# Patient Record
Sex: FEMALE | NOT HISPANIC OR LATINO | Employment: FULL TIME | URBAN - METROPOLITAN AREA
[De-identification: names, ages, dates, MRNs, and addresses within clinical notes are randomized per-mention and may not be internally consistent; named-entity substitution may affect disease eponyms.]

---

## 2017-01-24 ENCOUNTER — OFFICE VISIT (OUTPATIENT)
Dept: FAMILY MEDICINE CLINIC | Facility: CLINIC | Age: 37
End: 2017-01-24

## 2017-01-24 VITALS
OXYGEN SATURATION: 99 % | WEIGHT: 154 LBS | BODY MASS INDEX: 24.75 KG/M2 | HEIGHT: 66 IN | DIASTOLIC BLOOD PRESSURE: 80 MMHG | HEART RATE: 80 BPM | TEMPERATURE: 98.4 F | SYSTOLIC BLOOD PRESSURE: 120 MMHG

## 2017-01-24 DIAGNOSIS — R05.9 COUGH: Primary | ICD-10-CM

## 2017-01-24 PROCEDURE — 99213 OFFICE O/P EST LOW 20 MIN: CPT | Performed by: NURSE PRACTITIONER

## 2017-01-24 RX ORDER — DEXTROMETHORPHAN HYDROBROMIDE AND PROMETHAZINE HYDROCHLORIDE 15; 6.25 MG/5ML; MG/5ML
5 SYRUP ORAL 4 TIMES DAILY PRN
Qty: 240 ML | Refills: 0 | Status: SHIPPED | OUTPATIENT
Start: 2017-01-24 | End: 2017-11-16

## 2017-01-24 RX ORDER — LORATADINE 10 MG/1
10 TABLET ORAL DAILY
Qty: 30 TABLET | Refills: 1 | Status: SHIPPED | OUTPATIENT
Start: 2017-01-24 | End: 2017-11-16

## 2017-01-24 RX ORDER — ALBUTEROL SULFATE 90 UG/1
2 AEROSOL, METERED RESPIRATORY (INHALATION) EVERY 4 HOURS PRN
Qty: 1 INHALER | Refills: 11 | Status: SHIPPED | OUTPATIENT
Start: 2017-01-24 | End: 2017-11-16

## 2017-01-24 NOTE — PROGRESS NOTES
Subjective   Sue Hall is a 36 y.o. female.     History of Present Illness Persistent dry cough for one month. Some post-nasal drip. No SOB or wheezing. No chest congestion. No fever. Treated with Nyquil and Robitussin. It is worse at night. Having coughing spasms. Will occasionally wheeze with coughing fits.    The following portions of the patient's history were reviewed and updated as appropriate: allergies, current medications, past family history, past medical history, past social history, past surgical history and problem list.    Review of Systems   Constitutional: Negative for fatigue, fever and unexpected weight change.   HENT: Negative for congestion, hearing loss, nosebleeds, rhinorrhea, sore throat, trouble swallowing and voice change.    Eyes: Negative for pain, discharge, redness and visual disturbance.   Respiratory: Negative for cough, chest tightness, shortness of breath and wheezing.    Cardiovascular: Negative for chest pain, palpitations and leg swelling.   Gastrointestinal: Negative for abdominal distention, abdominal pain, anal bleeding, blood in stool, constipation, diarrhea, nausea and vomiting.   Endocrine: Negative for cold intolerance, heat intolerance, polydipsia, polyphagia and polyuria.   Genitourinary: Negative for dysuria, flank pain, frequency and hematuria.   Musculoskeletal: Negative for arthralgias, gait problem, joint swelling and myalgias.   Skin: Negative for color change and rash.   Neurological: Negative for dizziness, tremors, seizures, syncope, speech difficulty, weakness, numbness and headaches.   Hematological: Negative.    Psychiatric/Behavioral: Negative.        Objective   Physical Exam   Constitutional: She is oriented to person, place, and time. She appears well-developed and well-nourished. No distress.   HENT:   Head: Normocephalic and atraumatic.   Right Ear: Tympanic membrane and external ear normal.   Left Ear: Tympanic membrane and external ear normal.    Nose: Nose normal.   Mouth/Throat: Oropharynx is clear and moist. No oropharyngeal exudate.   Eyes: Conjunctivae are normal. Pupils are equal, round, and reactive to light. Right eye exhibits no discharge. Left eye exhibits no discharge. No scleral icterus.   Neck: Neck supple. No tracheal deviation present. No thyromegaly present.   Cardiovascular: Normal rate, regular rhythm and normal heart sounds.  Exam reveals no gallop and no friction rub.    No murmur heard.  Pulmonary/Chest: Effort normal and breath sounds normal. No respiratory distress. She has no wheezes.   Abdominal: Soft. Bowel sounds are normal. She exhibits no distension and no mass. There is no tenderness.   Musculoskeletal: She exhibits no edema or deformity.   Lymphadenopathy:     She has no cervical adenopathy.   Neurological: She is alert and oriented to person, place, and time. Coordination normal.   Skin: Skin is warm and dry. No rash noted. No erythema.   Psychiatric: She has a normal mood and affect. Her speech is normal and behavior is normal. Judgment and thought content normal.   Nursing note and vitals reviewed.      Assessment/Plan   Sue was seen today for cough.    Diagnoses and all orders for this visit:    Cough  -     albuterol (PROVENTIL HFA;VENTOLIN HFA) 108 (90 BASE) MCG/ACT inhaler; Inhale 2 puffs Every 4 (Four) Hours As Needed for wheezing.  -     loratadine (CLARITIN) 10 MG tablet; Take 1 tablet by mouth Daily.  -     promethazine-dextromethorphan (PROMETHAZINE-DM) 6.25-15 MG/5ML syrup; Take 5 mL by mouth 4 (Four) Times a Day As Needed for cough.      Encouraged her to use a humidifier. Follow up symptoms persist in 2 weeks. Consider CXR and steroids.

## 2017-01-24 NOTE — MR AVS SNAPSHOT
BarreraramseyTessie Hall   1/24/2017 12:45 PM   Office Visit    Provider:  Santy Armendariz DNP, APRN   Department:  [facility]   Dept Phone:  [phone]                Your Full Care Plan              Today's Medication Changes          These changes are accurate as of: 1/24/17  1:37 PM.  If you have any questions, ask your nurse or doctor.               New Medication(s)Ordered:     albuterol 108 (90 BASE) MCG/ACT inhaler   Commonly known as:  PROVENTIL HFA;VENTOLIN HFA   Inhale 2 puffs Every 4 (Four) Hours As Needed for wheezing.   Started by:  Santy Armendariz DNP, APRN       loratadine 10 MG tablet   Commonly known as:  CLARITIN   Take 1 tablet by mouth Daily.   Started by:  Santy Armendariz DNP, APRN       promethazine-dextromethorphan 6.25-15 MG/5ML syrup   Commonly known as:  PROMETHAZINE-DM   Take 5 mL by mouth 4 (Four) Times a Day As Needed for cough.   Started by:  Santy Armendariz DNP, APRN            Where to Get Your Medications      These medications were sent to [facility]/pharmacy #[id number] - [place], [place] - [address] - [phone]  - [phone] [address] [address]     Phone:  [phone]     albuterol 108 (90 BASE) MCG/ACT inhaler    loratadine 10 MG tablet    promethazine-dextromethorphan 6.25-15 MG/5ML syrup                  Your Updated Medication List          This list is accurate as of: 1/24/17  1:37 PM.  Always use your most recent med list.                albuterol 108 (90 BASE) MCG/ACT inhaler   Commonly known as:  PROVENTIL HFA;VENTOLIN HFA   Inhale 2 puffs Every 4 (Four) Hours As Needed for wheezing.       loratadine 10 MG tablet   Commonly known as:  CLARITIN   Take 1 tablet by mouth Daily.       promethazine-dextromethorphan 6.25-15 MG/5ML syrup   Commonly known as:  PROMETHAZINE-DM   Take 5 mL by mouth 4 (Four) Times a Day As Needed for cough.               You Were Diagnosed With        "Codes Comments    Cough    -  Primary ICD-10-CM: R05  ICD-9-CM: 786.2       Instructions     None    Patient Instructions History      MyChart Signup     [facility] allows you to send messages to your doctor, view your test results, renew your prescriptions, schedule appointments, and more. To sign up, go to [employer] and click on the Sign Up Now link in the New User? box. Enter your [employer] Activation Code exactly as it appears below along with the last four digits of your Social Security Number and your Date of Birth () to complete the sign-up process. If you do not sign up before the expiration date, you must request a new code.    [employer] Activation Code: [email or web address]  Expires: 2017  1:37 PM    If you have questions, you can email [email or web address] or call [phone] to talk to our [employer] staff. Remember, [employer] is NOT to be used for urgent needs. For medical emergencies, dial 911.               Other Info from Your Visit           Your Appointments     Mar 15, 2017  8:30 AM EDT   Physical with Isabel Mckeon MD   [facility] (--)    [address] [place] [address] [address]  [facility] [id number] [phone]           Arrive 15 minutes prior to appointment.              Allergies     No Known Allergies      Reason for Visit     Cough mostly dry cough, been going on since december. Pt states that she doesnt sleep well for it keeping her up.       Vital Signs     Blood Pressure Pulse Temperature Height Weight Oxygen Saturation    120/80 80 98.4 °F (36.9 °C) 65.5\" (166.4 cm) 154 lb (69.9 kg) 99%    Body Mass Index Smoking Status                25.24 kg/m2 Never Smoker          Problems and Diagnoses Noted     Headache    Cough    -  Primary      "

## 2017-03-15 ENCOUNTER — OFFICE VISIT (OUTPATIENT)
Dept: FAMILY MEDICINE CLINIC | Facility: CLINIC | Age: 37
End: 2017-03-15

## 2017-03-15 VITALS
WEIGHT: 155 LBS | HEART RATE: 66 BPM | BODY MASS INDEX: 25.83 KG/M2 | TEMPERATURE: 97.9 F | HEIGHT: 65 IN | DIASTOLIC BLOOD PRESSURE: 82 MMHG | SYSTOLIC BLOOD PRESSURE: 114 MMHG | OXYGEN SATURATION: 99 %

## 2017-03-15 DIAGNOSIS — Z00.00 ANNUAL PHYSICAL EXAM: Primary | ICD-10-CM

## 2017-03-15 DIAGNOSIS — R31.9 HEMATURIA: ICD-10-CM

## 2017-03-15 DIAGNOSIS — M76.32 ILIOTIBIAL BAND SYNDROME OF BOTH SIDES: ICD-10-CM

## 2017-03-15 DIAGNOSIS — M76.31 ILIOTIBIAL BAND SYNDROME OF BOTH SIDES: ICD-10-CM

## 2017-03-15 LAB
25(OH)D3 SERPL-MCNC: 31.3 NG/ML
ALBUMIN SERPL-MCNC: 4.6 G/DL (ref 3.2–4.8)
ALBUMIN/GLOB SERPL: 1.5 G/DL (ref 1.5–2.5)
ALP SERPL-CCNC: 67 U/L (ref 25–100)
ALT SERPL W P-5'-P-CCNC: 24 U/L (ref 7–40)
ANION GAP SERPL CALCULATED.3IONS-SCNC: 10 MMOL/L (ref 3–11)
ARTICHOKE IGE QN: 96 MG/DL (ref 0–130)
AST SERPL-CCNC: 24 U/L (ref 0–33)
BASOPHILS # BLD AUTO: 0.01 10*3/MM3 (ref 0–0.2)
BASOPHILS NFR BLD AUTO: 0.1 % (ref 0–1)
BILIRUB BLD-MCNC: NEGATIVE MG/DL
BILIRUB SERPL-MCNC: 1.1 MG/DL (ref 0.3–1.2)
BUN BLD-MCNC: 12 MG/DL (ref 9–23)
BUN/CREAT SERPL: 20 (ref 7–25)
CALCIUM SPEC-SCNC: 10.4 MG/DL (ref 8.7–10.4)
CHLORIDE SERPL-SCNC: 104 MMOL/L (ref 99–109)
CHOLEST SERPL-MCNC: 166 MG/DL (ref 0–200)
CLARITY, POC: CLEAR
CO2 SERPL-SCNC: 26 MMOL/L (ref 20–31)
COLOR UR: YELLOW
CREAT BLD-MCNC: 0.6 MG/DL (ref 0.6–1.3)
DEPRECATED RDW RBC AUTO: 44 FL (ref 37–54)
EOSINOPHIL # BLD AUTO: 0.13 10*3/MM3 (ref 0.1–0.3)
EOSINOPHIL NFR BLD AUTO: 1.8 % (ref 0–3)
ERYTHROCYTE [DISTWIDTH] IN BLOOD BY AUTOMATED COUNT: 13.4 % (ref 11.3–14.5)
GFR SERPL CREATININE-BSD FRML MDRD: 112 ML/MIN/1.73
GFR SERPL CREATININE-BSD FRML MDRD: 136 ML/MIN/1.73
GLOBULIN UR ELPH-MCNC: 3.1 GM/DL
GLUCOSE BLD-MCNC: 84 MG/DL (ref 70–100)
GLUCOSE UR STRIP-MCNC: NEGATIVE MG/DL
HBA1C MFR BLD: 5.1 % (ref 4.8–5.6)
HCT VFR BLD AUTO: 42.9 % (ref 34.5–44)
HDLC SERPL-MCNC: 71 MG/DL (ref 40–60)
HGB BLD-MCNC: 14 G/DL (ref 11.5–15.5)
IMM GRANULOCYTES # BLD: 0.01 10*3/MM3 (ref 0–0.03)
IMM GRANULOCYTES NFR BLD: 0.1 % (ref 0–0.6)
KETONES UR QL: NEGATIVE
LEUKOCYTE EST, POC: NEGATIVE
LYMPHOCYTES # BLD AUTO: 2.28 10*3/MM3 (ref 0.6–4.8)
LYMPHOCYTES NFR BLD AUTO: 31.4 % (ref 24–44)
MCH RBC QN AUTO: 29.5 PG (ref 27–31)
MCHC RBC AUTO-ENTMCNC: 32.6 G/DL (ref 32–36)
MCV RBC AUTO: 90.5 FL (ref 80–99)
MONOCYTES # BLD AUTO: 0.48 10*3/MM3 (ref 0–1)
MONOCYTES NFR BLD AUTO: 6.6 % (ref 0–12)
NEUTROPHILS # BLD AUTO: 4.34 10*3/MM3 (ref 1.5–8.3)
NEUTROPHILS NFR BLD AUTO: 60 % (ref 41–71)
NITRITE UR-MCNC: NEGATIVE MG/ML
PH UR: 6.5 [PH] (ref 5–8)
PLATELET # BLD AUTO: 335 10*3/MM3 (ref 150–450)
PMV BLD AUTO: 10.8 FL (ref 6–12)
POTASSIUM BLD-SCNC: 4.5 MMOL/L (ref 3.5–5.5)
PROT SERPL-MCNC: 7.7 G/DL (ref 5.7–8.2)
PROT UR STRIP-MCNC: NEGATIVE MG/DL
RBC # BLD AUTO: 4.74 10*6/MM3 (ref 3.89–5.14)
RBC # UR STRIP: ABNORMAL /UL
SODIUM BLD-SCNC: 140 MMOL/L (ref 132–146)
SP GR UR: 1.01 (ref 1–1.03)
TRIGL SERPL-MCNC: 63 MG/DL (ref 0–150)
TSH SERPL DL<=0.05 MIU/L-ACNC: 2.05 MIU/ML (ref 0.35–5.35)
UROBILINOGEN UR QL: NORMAL
VIT B12 BLD-MCNC: 626 PG/ML (ref 211–911)
WBC NRBC COR # BLD: 7.25 10*3/MM3 (ref 3.5–10.8)

## 2017-03-15 PROCEDURE — 82306 VITAMIN D 25 HYDROXY: CPT | Performed by: FAMILY MEDICINE

## 2017-03-15 PROCEDURE — 85025 COMPLETE CBC W/AUTO DIFF WBC: CPT | Performed by: FAMILY MEDICINE

## 2017-03-15 PROCEDURE — 81003 URINALYSIS AUTO W/O SCOPE: CPT | Performed by: FAMILY MEDICINE

## 2017-03-15 PROCEDURE — 99395 PREV VISIT EST AGE 18-39: CPT | Performed by: FAMILY MEDICINE

## 2017-03-15 PROCEDURE — 80053 COMPREHEN METABOLIC PANEL: CPT | Performed by: FAMILY MEDICINE

## 2017-03-15 PROCEDURE — 83036 HEMOGLOBIN GLYCOSYLATED A1C: CPT | Performed by: FAMILY MEDICINE

## 2017-03-15 PROCEDURE — 87086 URINE CULTURE/COLONY COUNT: CPT | Performed by: FAMILY MEDICINE

## 2017-03-15 PROCEDURE — 84443 ASSAY THYROID STIM HORMONE: CPT | Performed by: FAMILY MEDICINE

## 2017-03-15 PROCEDURE — 82607 VITAMIN B-12: CPT | Performed by: FAMILY MEDICINE

## 2017-03-15 PROCEDURE — 80061 LIPID PANEL: CPT | Performed by: FAMILY MEDICINE

## 2017-03-15 PROCEDURE — 36415 COLL VENOUS BLD VENIPUNCTURE: CPT | Performed by: FAMILY MEDICINE

## 2017-03-15 NOTE — PATIENT INSTRUCTIONS
Iliotibial Band Syndrome With Rehab  The iliotibial (IT) band is a tendon that connects the hip muscles to the shinbone (tibia) and to one of the bones of the pelvis (ileum). The IT band passes by the knee and is often irritated by the outer portion of the knee (lateral femoral condyle). A fluid filled sac (bursa) exists between the tendon and the bone, to cushion and reduce friction. Overuse of the tendon may cause excessive friction, which results in IT band syndrome. This condition involves inflammation of the bursa (bursitis) and/or inflammation of the IT band (tendinitis).  SYMPTOMS   · Pain, tenderness, swelling, warmth, or redness over the IT band, at the outer knee (above the joint).  · Pain that travels up or down the thigh or leg.  · Initially, pain at the beginning of an exercise, that decreases once warmed up. Eventually, pain throughout the activity, getting worse as the activity continues. May cause the athlete to stop in the middle of training or competing.  · Pain that gets worse when running down hills or stairs, on banked tracks, or next to the curb on the street.  · Pain that increases when the foot of the affected leg hits the ground.  · Possibly, a crackling sound (crepitation) when the tendon or bursa is moved or touched.  CAUSES   IT band syndrome is caused by irritation of the IT band and the underlying bursa. This eventually results in inflammation and pain. IT band syndrome is an overuse injury.   RISK INCREASES WITH:  · Sports with repetitive knee-bending activities (distance running, cycling).  · Incorrect training techniques, including sudden changes in the intensity, frequency, or duration of training.  · Not enough rest between workouts.  · Poor strength and flexibility, especially a tight IT band.  · Failure to warm up properly before activity.  · [place] legs.  · Arthritis of the knee.  PREVENTION   · Warm up and stretch properly before activity.  · Allow for adequate recovery between  workouts.  · Maintain physical fitness:    Strength, flexibility, and endurance.    Cardiovascular fitness.  · Learn and use proper training technique, including reducing running mileage, shortening stride, and avoiding running on hills and banked surfaces.  · Wear arch supports (orthotics), if you have flat feet.  PROGNOSIS   If treated properly, IT band syndrome usually goes away within 6 weeks of treatment.  RELATED COMPLICATIONS   · Longer healing time, if not properly treated, or if not given enough time to heal.  · Recurring inflammation of the tendon and bursa, that may result in a chronic condition.  · Recurring symptoms, if activity is resumed too soon, with overuse, with a direct blow, or with poor training technique.  · Inability to complete training or competition.  TREATMENT   Treatment first involves the use of ice and medicine, to reduce pain and inflammation. The use of strengthening and stretching exercises may help reduce pain with activity. These exercises may be performed at home or with a therapist. For individuals with flat feet, an arch support (orthotic) may be helpful. Some individuals find that wearing a knee sleeve or compression bandage around the knee during workouts provides some relief. Certain training techniques, such as adjusting stride length, avoiding running on hills or stairs, changing the direction you run on a circular or banked track, or changing the side of the road you run on, if you run next to the curb, may help decrease symptoms of IT band syndrome. Cyclists may need to change the seat height or foot position on their bicycles. An injection of cortisone into the bursa may be recommended. Surgery to remove the inflamed bursa and/or part of the IT band is only considered after at least 6 months of non-surgical treatment.   MEDICATION   · If pain medicine is needed, nonsteroidal anti-inflammatory medicines (aspirin and ibuprofen), or other minor pain relievers  (acetaminophen), are often advised.  · Do not take pain medicine for 7 days before surgery.  · Prescription pain relievers may be given, if your caregiver thinks they are needed. Use only as directed and only as much as you need.  · Corticosteroid injections may be given by your caregiver. These injections should be reserved for the most serious cases, because they may only be given a certain number of times.  HEAT AND COLD  · Cold treatment (icing) should be applied for 10 to 15 minutes every 2 to 3 hours for inflammation and pain, and immediately after activity that aggravates your symptoms. Use ice packs or an ice massage.  · Heat treatment may be used before performing stretching and strengthening activities prescribed by your caregiver, physical therapist, or . Use a heat pack or a warm water soak.  SEEK MEDICAL CARE IF:   · Symptoms get worse or do not improve in 2 to 4 weeks, despite treatment.  · New, unexplained symptoms develop. (Drugs used in treatment may produce side effects.)  EXERCISES   RANGE OF MOTION (ROM) AND STRETCHING EXERCISES - Iliotibial Band Syndrome  These exercises may help you when beginning to rehabilitate your injury. Your symptoms may go away with or without further involvement from your physician, physical therapist or . While completing these exercises, remember:   · Restoring tissue flexibility helps normal motion to return to the joints. This allows healthier, less painful movement and activity.  · An effective stretch should be held for at least 30 seconds.  · A stretch should never be painful. You should only feel a gentle lengthening or release in the stretched tissue.  STRETCH - Quadriceps, Prone   · Lie on your stomach on a firm surface, such as a bed or padded floor.  · Bend your right / left knee and grasp your ankle. If you are unable to reach your ankle or pant leg, use a belt around your foot to lengthen your reach.  · Gently pull your heel  toward your buttocks. Your knee should not slide out to the side. You should feel a stretch in the front of your thigh and knee.  · Hold this position for __________ seconds.  Repeat __________ times. Complete this stretch __________ times per day.   STRETCH - Iliotibial Band  · On the floor or bed, lie on your side, so your right / left leg is on top. Bend your knee and grab your ankle.  · Slowly bring your knee back so that your thigh is in line with your trunk. Keep your heel at your buttocks and gently arch your back, so your head, shoulders and hips line up.  · Slowly lower your leg so that your knee approaches the floor or bed, until you feel a gentle stretch on the outside of your right / left thigh. If you do not feel a stretch and your knee will not fall farther, place the heel of your opposite foot on top of your knee, and pull your thigh down farther.  · Hold this stretch for __________ seconds.  Repeat __________ times. Complete this stretch __________ times per day.  STRENGTHENING EXERCISES - Iliotibial Band Syndrome  Improving the flexibility of the IT band will best relieve your discomfort due to IT band syndrome. Strengthening exercises, however, can help improve both muscle endurance and joint mechanics, reducing the factors that can contribute to this condition. Your physician, physical therapist or  may provide you with exercises that train specific muscle groups that are especially weak. The following exercises target muscles that are often weak in people who have IT band syndrome.  STRENGTH - Hip Abductors, Straight Leg Raises   Be aware of your form throughout the entire exercise, so that you exercise the correct muscles. Poor form means that you are not strengthening the correct muscles.  · Lie on your side, so that your head, shoulders, knee and hip line up. You may bend your lower knee to help maintain your balance. Your right / left leg should be on top.  · Roll your hips  slightly forward, so that your hips are stacked directly over each other and your right / left knee is facing forward.  · Lift your top leg up 4-6 inches, leading with your heel. Be sure that your foot does not drift forward and that your knee does not roll toward the ceiling.  · Hold this position for __________ seconds. You should feel the muscles in your outer hip lifting (you may not notice this until your leg begins to tire).  · Slowly lower your leg to the starting position. Allow the muscles to fully relax before beginning the next repetition.  Repeat __________ times. Complete this exercise __________ times per day.   STRENGTH - Quad/VMO, Isometric  · Sit in a chair with your right / left knee slightly bent. With your fingertips, feel the VMO muscle (just above the inside of your knee). The VMO is important in controlling the position of your kneecap.  · Keeping your fingertips on this muscle. Without actually moving your leg, attempt to drive your knee down, as if straightening your leg. You should feel your VMO tense. If you have a difficult time, you may wish to try the same exercise on your healthy knee first.  · Tense this muscle as hard as you can, without increasing any knee pain.  · Hold for __________ seconds. Relax the muscles slowly and completely between each repetition.  Repeat __________ times. Complete this exercise __________ times per day.      This information is not intended to replace advice given to you by your health care provider. Make sure you discuss any questions you have with your health care provider.     Document Released: 12/18/2006 Document Revised: 01/08/2016 Document Reviewed: 04/01/2010  [employer] Interactive Patient Education ©2016 [employer]    Escitalopram tablets  What is this medicine?  ESCITALOPRAM (es sye DHEERAJ oh pram) is used to treat depression and certain types of anxiety.  This medicine may be used for other purposes; ask your health care provider or pharmacist  if you have questions.  COMMON BRAND NAME(S): Lexapro  What should I tell my health care provider before I take this medicine?  They need to know if you have any of these conditions:  -bipolar disorder or a family history of bipolar disorder  -diabetes  -glaucoma  -heart disease  -kidney or liver disease  -receiving electroconvulsive therapy  -seizures (convulsions)  -suicidal thoughts, plans, or attempt by you or a family member  -an unusual or allergic reaction to escitalopram, the related drug citalopram, other medicines, foods, dyes, or preservatives  -pregnant or trying to become pregnant  -breast-feeding  How should I use this medicine?  Take this medicine by mouth with a glass of water. Follow the directions on the prescription label. You can take it with or without food. If it upsets your stomach, take it with food. Take your medicine at regular intervals. Do not take it more often than directed. Do not stop taking this medicine suddenly except upon the advice of your doctor. Stopping this medicine too quickly may cause serious side effects or your condition may worsen.  A special MedGuide will be given to you by the pharmacist with each prescription and refill. Be sure to read this information carefully each time.  Talk to your pediatrician regarding the use of this medicine in children. Special care may be needed.  Overdosage: If you think you have taken too much of this medicine contact a poison control center or emergency room at once.  NOTE: This medicine is only for you. Do not share this medicine with others.  What if I miss a dose?  If you miss a dose, take it as soon as you can. If it is almost time for your next dose, take only that dose. Do not take double or extra doses.  What may interact with this medicine?  Do not take this medicine with any of the following medications:  -certain medicines for fungal infections like fluconazole, itraconazole, ketoconazole, posaconazole,  voriconazole  -cisapride  -citalopram  -dofetilide  -dronedarone  -linezolid  -MAOIs like Carbex, Eldepryl, Marplan, Nardil, and Parnate  -methylene blue (injected into a vein)  -pimozide  -thioridazine  -ziprasidone  This medicine may also interact with the following medications:  -alcohol  -aspirin and aspirin-like medicines  -carbamazepine  -certain medicines for depression, anxiety, or psychotic disturbances  -certain medicines for migraine headache like almotriptan, eletriptan, frovatriptan, naratriptan, rizatriptan, sumatriptan, zolmitriptan  -certain medicines for sleep  -certain medicines that treat or prevent blood clots like warfarin, enoxaparin, dalteparin  -cimetidine  -diuretics  -fentanyl  -furazolidone  -isoniazid  -lithium  -metoprolol  -NSAIDs, medicines for pain and inflammation, like ibuprofen or naproxen  -other medicines that prolong the QT interval (cause an abnormal heart rhythm)  -procarbazine  -rasagiline  -supplements like [place]'s wort, kava kava, valerian  -tramadol  -tryptophan  This list may not describe all possible interactions. Give your health care provider a list of all the medicines, herbs, non-prescription drugs, or dietary supplements you use. Also tell them if you smoke, drink alcohol, or use illegal drugs. Some items may interact with your medicine.  What should I watch for while using this medicine?  Tell your doctor if your symptoms do not get better or if they get worse. Visit your doctor or health care professional for regular checks on your progress. Because it may take several weeks to see the full effects of this medicine, it is important to continue your treatment as prescribed by your doctor.  Patients and their families should watch out for new or worsening thoughts of suicide or depression. Also watch out for sudden changes in feelings such as feeling anxious, agitated, panicky, irritable, hostile, aggressive, impulsive, severely restless, overly excited and  hyperactive, or not being able to sleep. If this happens, especially at the beginning of treatment or after a change in dose, call your health care professional.  You may get drowsy or dizzy. Do not drive, use machinery, or do anything that needs mental alertness until you know how this medicine affects you. Do not stand or sit up quickly, especially if you are an older patient. This reduces the risk of dizzy or fainting spells. Alcohol may interfere with the effect of this medicine. Avoid alcoholic drinks.  Your mouth may get dry. Chewing sugarless gum or sucking hard candy, and drinking plenty of water may help. Contact your doctor if the problem does not go away or is severe.  What side effects may I notice from receiving this medicine?  Side effects that you should report to your doctor or health care professional as soon as possible:  -allergic reactions like skin rash, itching or hives, swelling of the face, lips, or tongue  -confusion  -feeling faint or lightheaded, falls  -fast talking and excited feelings or actions that are out of control  -hallucination, loss of contact with reality  -seizures  -suicidal thoughts or other mood changes  -unusual bleeding or bruising  Side effects that usually do not require medical attention (report to your doctor or health care professional if they continue or are bothersome):  -blurred vision  -changes in appetite  -change in sex drive or performance  -headache  -increased sweating  -nausea  This list may not describe all possible side effects. Call your doctor for medical advice about side effects. You may report side effects to FDA at 5-439-FDA-7966.  Where should I keep my medicine?  Keep out of reach of children.  Store at room temperature between 15 and 30 degrees C (59 and 86 degrees F). Throw away any unused medicine after the expiration date.  NOTE: This sheet is a summary. It may not cover all possible information. If you have questions about this medicine, talk  to your doctor, pharmacist, or health care provider.     © 2016, [employer]. (2014-07-15 12:32:55)

## 2017-03-15 NOTE — PROGRESS NOTES
Subjective   Sue Hall is a 36 y.o. female and is here for a comprehensive physical exam. The patient reports no problems. Patient reports last physical date of 2016  Pt last physical 3/7/2016.  Pt is not fasting.  Pt last pap 2015. Pt sees Dr Aguilar at [facility]  Pt last mammagram at age of 28.    Reports that she exercises and runs 3 times a week.  She is recently in the last few years started having bilateral knee pain left greater than right when she runs.  She did try some IT band exercises and that did help.  She reports that she is not good at stretching prior to running or after running.  She is not interested in seeing a physical therapist at this time.    She also reports that she has had some anxiety off and on for the last few months.  It is worse when she is at home alone because her  is traveling.  She reports that she feels overwhelmed and anxious.  She is usually able to calm herself down and is not interested in starting the medicine at this time.  No suicidal or homicidal ideation.    She also reports that she has an upper back pain due to large breasts.  She has discussed this with her gynecologist in the past and is interested in breast reduction surgery.  She has not yet done physical therapy.  She continues to keep her weight in check.  She reports she has to wear to sports bottles when she runs.    Do you take any herbs or supplements that were not prescribed by a doctor? no vit d otc  Are you taking calcium supplements? yes  Are you taking aspirin daily? no  Family history of ovarian cancer? no  Family history of breast cancer? no  FH of endometrial cancer? yes pat gm  FH of cervical cancer? na  FH of colon cancer? no    Cancer Screening  Mammogram up-to-date?  yes  If yes, last exam date: she had 1 mammogram at 28 palp nodule  BMD up-to-date? na  If yes, last exam date: na  Colonoscopy up-to-date? na   If yes, last exam date: na  Pap up-to-date? yes   If yes, last exam date:  2016     History:  LMP: Patient's last menstrual period was 2017 (exact date).  Menopause at na years  Last pap date: 3/2016  Abnormal pap? no  : 2  Para: 2    Immunization History  Tdap?   HPV? not applicable  Pneumonia? not applicable  Shingles? not applicable    The following portions of the patient's history were reviewed and updated as appropriate: allergies, current medications, past family history, past medical history, past social history, past surgical history and problem list.    Past Medical History   Diagnosis Date   • Acne    • Cough    • Headache    • Sinusitis    • Sore throat    • Upper respiratory infection        Family History   Problem Relation Age of Onset   • Uterine cancer Other        Past Surgical History   Procedure Laterality Date   •  section         Social History     Social History   • Marital status:      Spouse name: N/A   • Number of children: N/A   • Years of education: N/A     Occupational History   • Not on file.     Social History Main Topics   • Smoking status: Never Smoker   • Smokeless tobacco: Not on file   • Alcohol use Yes      Comment: rarely   • Drug use: No   • Sexual activity: Yes     Partners: Male     Birth control/ protection: Condom     Other Topics Concern   • Not on file     Social History Narrative    Pt is . Pt has 2 children. 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       Review of Systems  Do you have pain that bothers you in your daily life? no  Review of Systems   Constitutional: Negative for chills, fatigue, fever and unexpected weight change.   HENT: Negative for congestion, ear pain, nosebleeds, rhinorrhea, sinus pressure, sneezing, sore throat and trouble swallowing.    Eyes: Negative for itching and visual disturbance.   Respiratory: Negative for cough, chest tightness, shortness of breath and wheezing.    Cardiovascular: Negative for chest pain, palpitations and leg swelling.   Gastrointestinal: Negative for abdominal  pain, anal bleeding, blood in stool, constipation, diarrhea, nausea and vomiting.   Endocrine: Negative for cold intolerance, heat intolerance, polydipsia and polyuria.   Genitourinary: Negative for difficulty urinating, frequency, hematuria and urgency.   Musculoskeletal: Negative for back pain, gait problem and myalgias.   Skin: Negative for rash and wound.   Neurological: Negative for dizziness, weakness, numbness and headaches.   Hematological: Negative for adenopathy. Does not bruise/bleed easily.   Psychiatric/Behavioral: Negative for agitation, confusion, decreased concentration and suicidal ideas. The patient is not nervous/anxious.        Objective   Physical Exam   Constitutional: She is oriented to person, place, and time. She appears well-developed and well-nourished.   HENT:   Head: Normocephalic and atraumatic.   Nose: Mucosal edema present.   Eyes: EOM are normal. Pupils are equal, round, and reactive to light. Right eye exhibits no discharge. Left eye exhibits no discharge.   Neck: Normal range of motion. Neck supple. No tracheal deviation present. No thyromegaly present.   Cardiovascular: Normal rate, regular rhythm and intact distal pulses.  Exam reveals no friction rub.    No murmur heard.  Pulmonary/Chest: Effort normal and breath sounds normal. No respiratory distress. She has no wheezes. She has no rales.   Abdominal: Soft. Bowel sounds are normal. She exhibits no distension and no mass. There is no tenderness. There is no rebound and no guarding.   Musculoskeletal: Normal range of motion. She exhibits no edema.        Right shoulder: She exhibits no swelling.   Neurological: She is alert and oriented to person, place, and time. She has normal reflexes. She displays normal reflexes.   Skin: Skin is warm and dry. No cyanosis. Nails show no clubbing.   Psychiatric: She has a normal mood and affect. Her behavior is normal. Judgment and thought content normal.        Assessment/Plan   Healthy  female exam.      1.   Problem List Items Addressed This Visit        Musculoskeletal and Integument    Iliotibial band syndrome of both sides       Other    Annual physical exam - Primary    Relevant Orders    CBC & Differential    TSH    Comprehensive Metabolic Panel    Hemoglobin A1c    Lipid Panel    Vitamin D 25 Hydroxy    Vitamin B12    POC Urinalysis Dipstick, Automated (Completed)    Urine Culture    CBC Auto Differential      Other Visit Diagnoses     Hematuria        Relevant Orders    Urine Culture            2. Patient Counseling:  --Nutrition: Stressed importance of moderation in sodium/caffeine intake, saturated fat and cholesterol, caloric balance, sufficient intake of fresh fruits, vegetables, fiber, calcium, iron, and 1 mg of folate supplement per day (for females capable of pregnancy).  --Discussed the issue of estrogen replacement, calcium supplement, and the daily use of baby aspirin.  --Exercise: Stressed the importance of regular exercise.   --Substance Abuse: Discussed cessation/primary prevention of tobacco, alcohol, or other drug use; driving or other dangerous activities under the influence; availability of treatment for abuse.    --Sexuality: Discussed sexually transmitted diseases, partner selection, use of condoms, avoidance of unintended pregnancy  and contraceptive alternatives.   --Injury prevention: Discussed safety belts, safety helmets, smoke detector, smoking near bedding or upholstery.   --Dental health: Discussed importance of regular tooth brushing, flossing, and dental visits.  --Immunizations reviewed.  --Discussed benefits of screening colonoscopy.  --After hours service discussed with patient    3. Discussed the patient's BMI with her.  The BMI is in the acceptable range  4. Follow up in one year

## 2017-03-17 LAB — BACTERIA SPEC AEROBE CULT: NORMAL

## 2017-11-16 ENCOUNTER — OFFICE VISIT (OUTPATIENT)
Dept: FAMILY MEDICINE CLINIC | Facility: CLINIC | Age: 37
End: 2017-11-16

## 2017-11-16 VITALS
TEMPERATURE: 98.8 F | WEIGHT: 153 LBS | BODY MASS INDEX: 25.46 KG/M2 | SYSTOLIC BLOOD PRESSURE: 120 MMHG | RESPIRATION RATE: 16 BRPM | DIASTOLIC BLOOD PRESSURE: 80 MMHG | HEART RATE: 72 BPM

## 2017-11-16 DIAGNOSIS — B07.0 PLANTAR WART OF RIGHT FOOT: ICD-10-CM

## 2017-11-16 DIAGNOSIS — M77.9 TENDONITIS: Primary | ICD-10-CM

## 2017-11-16 PROCEDURE — 99213 OFFICE O/P EST LOW 20 MIN: CPT | Performed by: FAMILY MEDICINE

## 2017-11-16 RX ORDER — MELOXICAM 7.5 MG/1
7.5 TABLET ORAL DAILY
Qty: 30 TABLET | Refills: 0 | Status: SHIPPED | OUTPATIENT
Start: 2017-11-16 | End: 2018-03-07

## 2017-11-16 NOTE — PROGRESS NOTES
Subjective   Sue Hall is a 37 y.o. female.     History of Present Illness   Right foot pain walking. Warts on foot.  Does running and exercise three times a week.  The following portions of the patient's history were reviewed and updated as appropriate: allergies, current medications, past family history, past medical history, past social history, past surgical history and problem list.    Review of Systems   Constitutional: Negative.    Eyes: Negative.    Cardiovascular: Negative.    Gastrointestinal: Negative.    Endocrine: Negative.    Musculoskeletal: Positive for arthralgias.       Objective   Physical Exam   Constitutional: She appears well-developed.   Pulmonary/Chest: Effort normal.   Musculoskeletal:        Right ankle: Tenderness (along 5 th metatarsal ray at midpoint, no mass and not swollen). Head of 5th metatarsal tenderness found. Achilles tendon normal.   Skin: Lesion (multiple plantar warts left foot trimmed to base at balll of foot.) noted.   Vitals reviewed.      Assessment/Plan   Sue was seen today for foot pain.    Diagnoses and all orders for this visit:    Tendonitis  -     meloxicam (MOBIC) 7.5 MG tablet; Take 1 tablet by mouth Daily.    Plantar wart of right foot

## 2017-11-27 ENCOUNTER — OFFICE VISIT (OUTPATIENT)
Dept: FAMILY MEDICINE CLINIC | Facility: CLINIC | Age: 37
End: 2017-11-27

## 2017-11-27 VITALS
SYSTOLIC BLOOD PRESSURE: 98 MMHG | TEMPERATURE: 98.9 F | BODY MASS INDEX: 25.66 KG/M2 | HEART RATE: 81 BPM | HEIGHT: 65 IN | OXYGEN SATURATION: 99 % | WEIGHT: 154 LBS | DIASTOLIC BLOOD PRESSURE: 70 MMHG

## 2017-11-27 DIAGNOSIS — J40 BRONCHITIS: Primary | ICD-10-CM

## 2017-11-27 LAB
EXPIRATION DATE: NORMAL
EXPIRATION DATE: NORMAL
FLUAV AG NPH QL: NORMAL
FLUBV AG NPH QL: NORMAL
INTERNAL CONTROL: NORMAL
INTERNAL CONTROL: NORMAL
Lab: NORMAL
Lab: NORMAL
S PYO AG THROAT QL: NEGATIVE

## 2017-11-27 PROCEDURE — 87804 INFLUENZA ASSAY W/OPTIC: CPT | Performed by: FAMILY MEDICINE

## 2017-11-27 PROCEDURE — 87880 STREP A ASSAY W/OPTIC: CPT | Performed by: FAMILY MEDICINE

## 2017-11-27 PROCEDURE — 99213 OFFICE O/P EST LOW 20 MIN: CPT | Performed by: FAMILY MEDICINE

## 2017-11-27 RX ORDER — BENZONATATE 200 MG/1
200 CAPSULE ORAL 3 TIMES DAILY PRN
Qty: 45 CAPSULE | Refills: 1 | Status: SHIPPED | OUTPATIENT
Start: 2017-11-27 | End: 2018-03-07

## 2017-11-27 RX ORDER — AZITHROMYCIN 250 MG/1
TABLET, FILM COATED ORAL
Qty: 6 TABLET | Refills: 0 | Status: SHIPPED | OUTPATIENT
Start: 2017-11-27 | End: 2018-03-07

## 2017-11-27 RX ORDER — ALBUTEROL SULFATE 90 UG/1
2 AEROSOL, METERED RESPIRATORY (INHALATION) EVERY 4 HOURS PRN
Qty: 18 G | Refills: 0 | Status: SHIPPED | OUTPATIENT
Start: 2017-11-27 | End: 2018-03-07

## 2017-11-27 NOTE — PROGRESS NOTES
"Fernandez Hall is a 37 y.o. female.     Pt complains of cough, sore throat, body chills, body aches, constant clearing throat. Fever last week. Started last Tuesday.    Cough   This is a new problem. The current episode started in the past 7 days. The problem has been gradually worsening. The problem occurs constantly. The cough is productive of sputum. Associated symptoms include ear pain, nasal congestion and postnasal drip. Pertinent negatives include no chest pain, chills, ear congestion, fever, headaches, heartburn, hemoptysis, myalgias, rash, rhinorrhea, sore throat or shortness of breath. Nothing aggravates the symptoms. She has tried nothing for the symptoms. The treatment provided no relief. There is no history of asthma, bronchiectasis, bronchitis, COPD, emphysema, environmental allergies or pneumonia.    sx onset last Tuesday and Wednesday.  Cough 2 days.  Non productive slight fever on Thursday.  Used otc meds.      The following portions of the patient's history were reviewed and updated as appropriate: allergies, current medications, past family history, past medical history, past social history, past surgical history and problem list.    Review of Systems   Constitutional: Negative for chills and fever.   HENT: Positive for ear pain and postnasal drip. Negative for rhinorrhea and sore throat.    Respiratory: Positive for cough. Negative for hemoptysis and shortness of breath.    Cardiovascular: Negative for chest pain.   Gastrointestinal: Negative for heartburn.   Musculoskeletal: Negative for myalgias.   Skin: Negative for rash.   Allergic/Immunologic: Negative for environmental allergies.   Neurological: Negative for headaches.       Objective     Vitals:    11/27/17 0918   BP: 98/70   Pulse: 81   Temp: 98.9 °F (37.2 °C)   SpO2: 99%   Weight: 154 lb (69.9 kg)   Height: 65\" (165.1 cm)       Physical Exam   Constitutional: She is oriented to person, place, and time. She appears " well-developed and well-nourished.   HENT:   Head: Normocephalic and atraumatic.   Right Ear: External ear normal.   Left Ear: External ear normal.   Nose: Nose normal.   Mouth/Throat: Oropharynx is clear and moist. No oropharyngeal exudate.   Eyes: EOM are normal. Pupils are equal, round, and reactive to light. Right eye exhibits no discharge. Left eye exhibits no discharge.   Neck: Normal range of motion. Neck supple. No thyromegaly present.   Cardiovascular: Normal rate, regular rhythm, normal heart sounds and intact distal pulses.    Pulmonary/Chest: Effort normal and breath sounds normal.   Abdominal: Soft. Bowel sounds are normal. She exhibits no mass. There is no tenderness.   Musculoskeletal: Normal range of motion.        Right shoulder: She exhibits no swelling.   Neurological: She is alert and oriented to person, place, and time. She has normal reflexes.   Skin: Skin is warm and dry. No cyanosis. Nails show no clubbing.   Psychiatric: She has a normal mood and affect. Her behavior is normal. Judgment and thought content normal.   Nursing note and vitals reviewed.      Assessment/Plan     Problem List Items Addressed This Visit        Respiratory    Bronchitis - Primary    Relevant Medications    azithromycin (ZITHROMAX) 250 MG tablet    albuterol (PROVENTIL HFA;VENTOLIN HFA) 108 (90 Base) MCG/ACT inhaler    benzonatate (TESSALON) 200 MG capsule    Other Relevant Orders    POC Influenza A / B (Completed)    POC Rapid Strep A (Completed)

## 2018-03-07 ENCOUNTER — OFFICE VISIT (OUTPATIENT)
Dept: FAMILY MEDICINE CLINIC | Facility: CLINIC | Age: 38
End: 2018-03-07

## 2018-03-07 VITALS
RESPIRATION RATE: 18 BRPM | DIASTOLIC BLOOD PRESSURE: 80 MMHG | WEIGHT: 152.2 LBS | SYSTOLIC BLOOD PRESSURE: 118 MMHG | OXYGEN SATURATION: 99 % | BODY MASS INDEX: 25.33 KG/M2 | HEART RATE: 93 BPM | TEMPERATURE: 100.4 F

## 2018-03-07 DIAGNOSIS — B95.0 STREPTOCOCCAL INFECTION GROUP A: Primary | ICD-10-CM

## 2018-03-07 DIAGNOSIS — R05.9 COUGH: ICD-10-CM

## 2018-03-07 LAB
EXPIRATION DATE: ABNORMAL
INTERNAL CONTROL: ABNORMAL
Lab: ABNORMAL
S PYO AG THROAT QL: POSITIVE

## 2018-03-07 PROCEDURE — 99213 OFFICE O/P EST LOW 20 MIN: CPT | Performed by: NURSE PRACTITIONER

## 2018-03-07 PROCEDURE — 87880 STREP A ASSAY W/OPTIC: CPT | Performed by: NURSE PRACTITIONER

## 2018-03-07 RX ORDER — BROMPHENIRAMINE MALEATE, PSEUDOEPHEDRINE HYDROCHLORIDE, AND DEXTROMETHORPHAN HYDROBROMIDE 2; 30; 10 MG/5ML; MG/5ML; MG/5ML
10 SYRUP ORAL 4 TIMES DAILY PRN
Qty: 473 ML | Refills: 0 | Status: SHIPPED | OUTPATIENT
Start: 2018-03-07 | End: 2018-03-17

## 2018-03-07 RX ORDER — AMOXICILLIN 875 MG/1
875 TABLET, COATED ORAL EVERY 12 HOURS SCHEDULED
Qty: 14 TABLET | Refills: 0 | Status: SHIPPED | OUTPATIENT
Start: 2018-03-07 | End: 2018-03-14

## 2018-03-07 NOTE — PROGRESS NOTES
Subjective   Sue Hall is a 37 y.o. female.   Chief Complaint   Patient presents with   • Sore Throat     sore throat started Monday, body chills    Acute Visit   Sore Throat    This is a new problem. The current episode started in the past 7 days. The problem has been gradually worsening. Neither side of throat is experiencing more pain than the other. Associated symptoms include congestion, coughing, headaches and shortness of breath. She has had no exposure to strep or mono. Treatments tried: tylenol- nyquil  The treatment provided mild relief.        The following portions of the patient's history were reviewed and updated as appropriate: allergies, current medications, past family history, past medical history, past social history, past surgical history and problem list.    Review of Systems   Constitutional: Positive for activity change, appetite change, chills and fatigue.   HENT: Positive for congestion, postnasal drip and sore throat. Negative for rhinorrhea and sneezing.    Eyes: Negative.    Respiratory: Positive for cough and shortness of breath. Negative for wheezing.    Gastrointestinal: Negative.    Musculoskeletal: Positive for myalgias.        Body aches    Skin: Negative.    Neurological: Positive for headaches. Negative for dizziness, seizures, light-headedness and numbness.       Objective   No Known Allergies     Visit Vitals  /80 (BP Location: Left arm, Patient Position: Sitting, Cuff Size: Adult)   Pulse 93   Temp 100.4 °F (38 °C) (Oral)   Resp 18   Wt 69 kg (152 lb 3.2 oz)   SpO2 99%   BMI 25.33 kg/m²       Physical Exam   Constitutional: She is oriented to person, place, and time. Vital signs are normal. She appears well-developed and well-nourished. She is cooperative. She appears ill.   HENT:   Head: Normocephalic.   Right Ear: Hearing, tympanic membrane, external ear and ear canal normal.   Left Ear: Hearing, tympanic membrane, external ear and ear canal normal.   Nose:  Rhinorrhea present. Right sinus exhibits no maxillary sinus tenderness and no frontal sinus tenderness. Left sinus exhibits no maxillary sinus tenderness and no frontal sinus tenderness.   Mouth/Throat: Mucous membranes are normal. Posterior oropharyngeal erythema present. Tonsils are 2+ on the right. Tonsils are 2+ on the left. No tonsillar exudate.   Neck: No JVD present.   Cardiovascular: Normal rate, regular rhythm, S1 normal, S2 normal and normal heart sounds.    Pulmonary/Chest: Effort normal and breath sounds normal. No stridor.   Lymphadenopathy:     She has cervical adenopathy.   Neurological: She is alert and oriented to person, place, and time.   Skin: Skin is warm, dry and intact.   Psychiatric: She has a normal mood and affect. Her behavior is normal.   Vitals reviewed.    Visit Vitals  /80 (BP Location: Left arm, Patient Position: Sitting, Cuff Size: Adult)   Pulse 93   Temp 100.4 °F (38 °C) (Oral)   Resp 18   Wt 69 kg (152 lb 3.2 oz)   SpO2 99%   BMI 25.33 kg/m²       Assessment/Plan   Sue was seen today for sore throat.    Diagnoses and all orders for this visit:    Streptococcal infection group A  -     POCT rapid strep A  -     amoxicillin (AMOXIL) 875 MG tablet; Take 1 tablet by mouth Every 12 (Twelve) Hours for 7 days.    Cough  -     brompheniramine-pseudoephedrine-DM 30-2-10 MG/5ML syrup; Take 10 mL by mouth 4 (Four) Times a Day As Needed for Congestion, Cough or Allergies for up to 10 days.      See patient instructions for Strep Throat.   Follow up with Dr. Mckeon as needed.              GORDY Hernandez

## 2018-03-27 ENCOUNTER — OFFICE VISIT (OUTPATIENT)
Dept: FAMILY MEDICINE CLINIC | Facility: CLINIC | Age: 38
End: 2018-03-27

## 2018-03-27 VITALS
HEART RATE: 71 BPM | HEIGHT: 65 IN | SYSTOLIC BLOOD PRESSURE: 110 MMHG | TEMPERATURE: 98.3 F | DIASTOLIC BLOOD PRESSURE: 68 MMHG | WEIGHT: 154 LBS | BODY MASS INDEX: 25.66 KG/M2 | OXYGEN SATURATION: 98 %

## 2018-03-27 DIAGNOSIS — M25.562 LEFT KNEE PAIN, UNSPECIFIED CHRONICITY: ICD-10-CM

## 2018-03-27 DIAGNOSIS — Z00.00 ANNUAL PHYSICAL EXAM: Primary | ICD-10-CM

## 2018-03-27 DIAGNOSIS — Z12.39 SCREENING FOR BREAST CANCER: ICD-10-CM

## 2018-03-27 LAB
BILIRUB BLD-MCNC: NEGATIVE MG/DL
CLARITY, POC: CLEAR
COLOR UR: YELLOW
GLUCOSE UR STRIP-MCNC: NEGATIVE MG/DL
KETONES UR QL: NEGATIVE
LEUKOCYTE EST, POC: NEGATIVE
NITRITE UR-MCNC: NEGATIVE MG/ML
PH UR: 6.5 [PH] (ref 5–8)
PROT UR STRIP-MCNC: NEGATIVE MG/DL
RBC # UR STRIP: NEGATIVE /UL
SP GR UR: 1.01 (ref 1–1.03)
UROBILINOGEN UR QL: NORMAL

## 2018-03-27 PROCEDURE — 81003 URINALYSIS AUTO W/O SCOPE: CPT | Performed by: FAMILY MEDICINE

## 2018-03-27 PROCEDURE — 99395 PREV VISIT EST AGE 18-39: CPT | Performed by: FAMILY MEDICINE

## 2018-03-27 NOTE — PATIENT INSTRUCTIONS
Preventive Care 18-39 Years, Female  Preventive care refers to lifestyle choices and visits with your health care provider that can promote health and wellness.  What does preventive care include?  · A yearly physical exam. This is also called an annual well check.  · Dental exams once or twice a year.  · Routine eye exams. Ask your health care provider how often you should have your eyes checked.  · Personal lifestyle choices, including:  ¨ Daily care of your teeth and gums.  ¨ Regular physical activity.  ¨ Eating a healthy diet.  ¨ Avoiding tobacco and drug use.  ¨ Limiting alcohol use.  ¨ Practicing safe sex.  ¨ Taking vitamin and mineral supplements as recommended by your health care provider.  What happens during an annual well check?  The services and screenings done by your health care provider during your annual well check will depend on your age, overall health, lifestyle risk factors, and family history of disease.  Counseling   Your health care provider may ask you questions about your:  · Alcohol use.  · Tobacco use.  · Drug use.  · Emotional well-being.  · Home and relationship well-being.  · Sexual activity.  · Eating habits.  · Work and work environment.  · Method of birth control.  · Menstrual cycle.  · Pregnancy history.  Screening   You may have the following tests or measurements:  · Height, weight, and BMI.  · Diabetes screening. This is done by checking your blood sugar (glucose) after you have not eaten for a while (fasting).  · Blood pressure.  · Lipid and cholesterol levels. These may be checked every 5 years starting at age 20.  · Skin check.  · Hepatitis C blood test.  · Hepatitis B blood test.  · Sexually transmitted disease (STD) testing.  · BRCA-related cancer screening. This may be done if you have a family history of breast, ovarian, tubal, or peritoneal cancers.  · Pelvic exam and Pap test. This may be done every 3 years starting at age 21. Starting at age 30, this may be done every 5  years if you have a Pap test in combination with an HPV test.  Discuss your test results, treatment options, and if necessary, the need for more tests with your health care provider.  Vaccines   Your health care provider may recommend certain vaccines, such as:  · Influenza vaccine. This is recommended every year.  · Tetanus, diphtheria, and acellular pertussis (Tdap, Td) vaccine. You may need a Td booster every 10 years.  · Varicella vaccine. You may need this if you have not been vaccinated.  · HPV vaccine. If you are 26 or younger, you may need three doses over 6 months.  · Measles, mumps, and rubella (MMR) vaccine. You may need at least one dose of MMR. You may also need a second dose.  · Pneumococcal 13-valent conjugate (PCV13) vaccine. You may need this if you have certain conditions and were not previously vaccinated.  · Pneumococcal polysaccharide (PPSV23) vaccine. You may need one or two doses if you smoke cigarettes or if you have certain conditions.  · Meningococcal vaccine. One dose is recommended if you are age 19-21 years and a first-year college student living in a residence [place], or if you have one of several medical conditions. You may also need additional booster doses.  · Hepatitis A vaccine. You may need this if you have certain conditions or if you travel or work in places where you may be exposed to hepatitis A.  · Hepatitis B vaccine. You may need this if you have certain conditions or if you travel or work in places where you may be exposed to hepatitis B.  · Haemophilus influenzae type b (Hib) vaccine. You may need this if you have certain risk factors.  Talk to your health care provider about which screenings and vaccines you need and how often you need them.  This information is not intended to replace advice given to you by your health care provider. Make sure you discuss any questions you have with your health care provider.  Document Released: 02/13/2003 Document Revised: 09/06/2017  Document Reviewed: 10/18/2016  [email or web address] Interactive Patient Education © 2017 [employer]

## 2018-03-27 NOTE — PROGRESS NOTES
Subjective   Sue Hall is a 37 y.o. female and is here for a comprehensive physical exam. The patient reports no problems. Patient reports last physical date of 2017    Last physical 3/15/17.  Pt last pap 3/2018.  Pt is not fasting- will walk back in for labs.  tdap .    Left knee pain off and on no catching or locking.  Worse going down stairs no effusion.  No loss of stability.  She has never had injury.  No weakness.  She does walk and does pilates/yoga.  Lateral knee pain.        Do you take any herbs or supplements that were not prescribed by a doctor? no  Are you taking calcium supplements? no  Are you taking aspirin daily? no  Family history of ovarian cancer? no  Family history of breast cancer? no  FH of endometrial cancer? no  FH of cervical cancer? na  FH of colon cancer? no    Cancer Screening  Mammogram up-to-date?  na  If yes, last exam date: will schedule in 6 mo.  BMD up-to-date? na  If yes, last exam date: na  Colonoscopy up-to-date? no   If yes, last exam date: no  Pap up-to-date? yes   If yes, last exam date: 3/18     History:  LMP: Patient's last menstrual period was 2018.  Menopause at na years  Last pap date: 3/18  Abnormal pap? no  : 2  Para: 2    Immunization History  Tdap?   HPV? not applicable  Pneumonia? not applicable  Shingles? not applicable    The following portions of the patient's history were reviewed and updated as appropriate: allergies, current medications, past family history, past medical history, past social history, past surgical history and problem list.    Past Medical History:   Diagnosis Date   • Acne    • Cough    • Headache    • Sinusitis    • Sore throat    • Upper respiratory infection        Family History   Problem Relation Age of Onset   • Uterine cancer Other        Past Surgical History:   Procedure Laterality Date   •  SECTION         Social History     Social History   • Marital status:      Spouse name: N/A   •  Number of children: N/A   • Years of education: N/A     Occupational History   • Not on file.     Social History Main Topics   • Smoking status: Never Smoker   • Smokeless tobacco: Never Used   • Alcohol use Yes      Comment: rarely   • Drug use: No   • Sexual activity: Yes     Partners: Male     Birth control/ protection: Condom     Other Topics Concern   • Not on file     Social History Narrative    Pt is . Pt has 2 children. 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       Review of Systems  Do you have pain that bothers you in your daily life? no  Review of Systems   Constitutional: Negative.    HENT: Negative.    Eyes: Negative.    Respiratory: Negative.    Cardiovascular: Negative.    Gastrointestinal: Negative.    Endocrine: Negative.    Genitourinary: Negative.    Musculoskeletal: Positive for gait problem.        Left knee pain     Skin: Negative.    Allergic/Immunologic: Negative.    Hematological: Negative.    Psychiatric/Behavioral: Negative.    All other systems reviewed and are negative.      Objective   Physical Exam   Constitutional: She is oriented to person, place, and time. She appears well-developed and well-nourished.   HENT:   Head: Normocephalic and atraumatic.   Right Ear: External ear normal.   Left Ear: External ear normal.   Nose: Nose normal.   Mouth/Throat: Oropharynx is clear and moist.   Eyes: EOM are normal. Pupils are equal, round, and reactive to light. Right eye exhibits no discharge. Left eye exhibits no discharge.   Neck: Normal range of motion. Neck supple.   Cardiovascular: Normal rate, regular rhythm, normal heart sounds and intact distal pulses.    Pulmonary/Chest: Effort normal and breath sounds normal.   Abdominal: Soft. Bowel sounds are normal. She exhibits no mass. There is no tenderness.   Musculoskeletal: Normal range of motion. She exhibits no edema, tenderness or deformity.        Right shoulder: She exhibits no swelling.   Neurological: She is alert and oriented to  person, place, and time. She has normal reflexes.   Skin: Skin is warm and dry. No cyanosis. Nails show no clubbing.   Psychiatric: She has a normal mood and affect. Her behavior is normal. Judgment and thought content normal.   Nursing note and vitals reviewed.       Assessment/Plan   Healthy female exam.    1.   Problem List Items Addressed This Visit        Musculoskeletal and Integument    Left knee pain    Relevant Orders    Ambulatory Referral to Physical Therapy Evaluate and treat       Other    Annual physical exam - Primary    Relevant Orders    CBC & Differential    TSH    Comprehensive Metabolic Panel    Hemoglobin A1c    Lipid Panel    Vitamin D 25 Hydroxy    Vitamin B12    POC Urinalysis Dipstick, Automated (Completed)    Screening for breast cancer    Relevant Orders    Mammo Screening Digital Tomosynthesis Bilateral With CAD      Other Visit Diagnoses    None.           2. Patient Counseling:  --Nutrition: Stressed importance of moderation in sodium/caffeine intake, saturated fat and cholesterol, caloric balance, sufficient intake of fresh fruits, vegetables, fiber, calcium, iron, and 1 mg of folate supplement per day (for females capable of pregnancy).  --Discussed the issue of estrogen replacement, calcium supplement, and the daily use of baby aspirin.  --Exercise: Stressed the importance of regular exercise.   --Substance Abuse: Discussed cessation/primary prevention of tobacco, alcohol, or other drug use; driving or other dangerous activities under the influence; availability of treatment for abuse.    --Sexuality: Discussed sexually transmitted diseases, partner selection, use of condoms, avoidance of unintended pregnancy  and contraceptive alternatives.   --Injury prevention: Discussed safety belts, safety helmets, smoke detector, smoking near bedding or upholstery.   --Dental health: Discussed importance of regular tooth brushing, flossing, and dental visits.  --Immunizations  reviewed.  --Discussed benefits of screening colonoscopy.  --After hours service discussed with patient    3. Discussed the patient's BMI with her.  The BMI is in the acceptable range  4. Follow up in one year

## 2018-04-27 ENCOUNTER — LAB (OUTPATIENT)
Dept: FAMILY MEDICINE CLINIC | Facility: CLINIC | Age: 38
End: 2018-04-27

## 2018-04-27 DIAGNOSIS — Z00.00 ANNUAL PHYSICAL EXAM: ICD-10-CM

## 2018-04-27 LAB
25(OH)D3 SERPL-MCNC: 19.7 NG/ML
ALBUMIN SERPL-MCNC: 4.6 G/DL (ref 3.2–4.8)
ALBUMIN/GLOB SERPL: 1.8 G/DL (ref 1.5–2.5)
ALP SERPL-CCNC: 60 U/L (ref 25–100)
ALT SERPL W P-5'-P-CCNC: 21 U/L (ref 7–40)
ANION GAP SERPL CALCULATED.3IONS-SCNC: 6 MMOL/L (ref 3–11)
ARTICHOKE IGE QN: 94 MG/DL (ref 0–130)
AST SERPL-CCNC: 23 U/L (ref 0–33)
BASOPHILS # BLD AUTO: 0.02 10*3/MM3 (ref 0–0.2)
BASOPHILS NFR BLD AUTO: 0.3 % (ref 0–1)
BILIRUB SERPL-MCNC: 0.8 MG/DL (ref 0.3–1.2)
BUN BLD-MCNC: 13 MG/DL (ref 9–23)
BUN/CREAT SERPL: 16.3 (ref 7–25)
CALCIUM SPEC-SCNC: 9.5 MG/DL (ref 8.7–10.4)
CHLORIDE SERPL-SCNC: 104 MMOL/L (ref 99–109)
CHOLEST SERPL-MCNC: 152 MG/DL (ref 0–200)
CO2 SERPL-SCNC: 27 MMOL/L (ref 20–31)
CREAT BLD-MCNC: 0.8 MG/DL (ref 0.6–1.3)
DEPRECATED RDW RBC AUTO: 44.3 FL (ref 37–54)
EOSINOPHIL # BLD AUTO: 0.13 10*3/MM3 (ref 0–0.3)
EOSINOPHIL NFR BLD AUTO: 1.9 % (ref 0–3)
ERYTHROCYTE [DISTWIDTH] IN BLOOD BY AUTOMATED COUNT: 13.8 % (ref 11.3–14.5)
GFR SERPL CREATININE-BSD FRML MDRD: 80 ML/MIN/1.73
GLOBULIN UR ELPH-MCNC: 2.6 GM/DL
GLUCOSE BLD-MCNC: 77 MG/DL (ref 70–100)
HBA1C MFR BLD: 5.4 % (ref 4.8–5.6)
HCT VFR BLD AUTO: 40.4 % (ref 34.5–44)
HDLC SERPL-MCNC: 57 MG/DL (ref 40–60)
HGB BLD-MCNC: 13.6 G/DL (ref 11.5–15.5)
IMM GRANULOCYTES # BLD: 0.02 10*3/MM3 (ref 0–0.03)
IMM GRANULOCYTES NFR BLD: 0.3 % (ref 0–0.6)
LYMPHOCYTES # BLD AUTO: 2.5 10*3/MM3 (ref 0.6–4.8)
LYMPHOCYTES NFR BLD AUTO: 36 % (ref 24–44)
MCH RBC QN AUTO: 29.5 PG (ref 27–31)
MCHC RBC AUTO-ENTMCNC: 33.7 G/DL (ref 32–36)
MCV RBC AUTO: 87.6 FL (ref 80–99)
MONOCYTES # BLD AUTO: 0.51 10*3/MM3 (ref 0–1)
MONOCYTES NFR BLD AUTO: 7.3 % (ref 0–12)
NEUTROPHILS # BLD AUTO: 3.79 10*3/MM3 (ref 1.5–8.3)
NEUTROPHILS NFR BLD AUTO: 54.5 % (ref 41–71)
PLATELET # BLD AUTO: 286 10*3/MM3 (ref 150–450)
PMV BLD AUTO: 10.9 FL (ref 6–12)
POTASSIUM BLD-SCNC: 4.4 MMOL/L (ref 3.5–5.5)
PROT SERPL-MCNC: 7.2 G/DL (ref 5.7–8.2)
RBC # BLD AUTO: 4.61 10*6/MM3 (ref 3.89–5.14)
SODIUM BLD-SCNC: 137 MMOL/L (ref 132–146)
TRIGL SERPL-MCNC: 64 MG/DL (ref 0–150)
TSH SERPL DL<=0.05 MIU/L-ACNC: 2.69 MIU/ML (ref 0.35–5.35)
VIT B12 BLD-MCNC: 652 PG/ML (ref 211–911)
WBC NRBC COR # BLD: 6.95 10*3/MM3 (ref 3.5–10.8)

## 2018-04-27 PROCEDURE — 80061 LIPID PANEL: CPT | Performed by: FAMILY MEDICINE

## 2018-04-27 PROCEDURE — 82306 VITAMIN D 25 HYDROXY: CPT | Performed by: FAMILY MEDICINE

## 2018-04-27 PROCEDURE — 85025 COMPLETE CBC W/AUTO DIFF WBC: CPT | Performed by: FAMILY MEDICINE

## 2018-04-27 PROCEDURE — 82607 VITAMIN B-12: CPT | Performed by: FAMILY MEDICINE

## 2018-04-27 PROCEDURE — 84443 ASSAY THYROID STIM HORMONE: CPT | Performed by: FAMILY MEDICINE

## 2018-04-27 PROCEDURE — 80053 COMPREHEN METABOLIC PANEL: CPT | Performed by: FAMILY MEDICINE

## 2018-04-27 PROCEDURE — 36415 COLL VENOUS BLD VENIPUNCTURE: CPT | Performed by: FAMILY MEDICINE

## 2018-04-27 PROCEDURE — 83036 HEMOGLOBIN GLYCOSYLATED A1C: CPT | Performed by: FAMILY MEDICINE

## 2018-08-31 ENCOUNTER — APPOINTMENT (OUTPATIENT)
Dept: MAMMOGRAPHY | Facility: HOSPITAL | Age: 38
End: 2018-08-31
Attending: FAMILY MEDICINE

## 2018-08-31 ENCOUNTER — HOSPITAL ENCOUNTER (OUTPATIENT)
Dept: MAMMOGRAPHY | Facility: HOSPITAL | Age: 38
Discharge: HOME | End: 2018-08-31
Attending: FAMILY MEDICINE | Admitting: OBSTETRICS & GYNECOLOGY

## 2018-08-31 DIAGNOSIS — Z12.39 SCREENING FOR BREAST CANCER: ICD-10-CM

## 2018-08-31 PROCEDURE — 77067 SCR MAMMO BI INCL CAD: CPT

## 2018-08-31 PROCEDURE — 77063 BREAST TOMOSYNTHESIS BI: CPT

## 2018-08-31 PROCEDURE — 77067 SCR MAMMO BI INCL CAD: CPT | Performed by: RADIOLOGY

## 2018-08-31 PROCEDURE — 77063 BREAST TOMOSYNTHESIS BI: CPT | Performed by: RADIOLOGY

## 2018-10-24 ENCOUNTER — FLU SHOT (OUTPATIENT)
Dept: FAMILY MEDICINE CLINIC | Facility: CLINIC | Age: 38
End: 2018-10-24

## 2018-10-24 DIAGNOSIS — Z23 NEED FOR INFLUENZA VACCINATION: ICD-10-CM

## 2018-10-24 PROCEDURE — 90686 IIV4 VACC NO PRSV 0.5 ML IM: CPT | Performed by: FAMILY MEDICINE

## 2018-10-24 PROCEDURE — 90471 IMMUNIZATION ADMIN: CPT | Performed by: FAMILY MEDICINE

## 2019-03-21 ENCOUNTER — TRANSCRIBE ORDERS (OUTPATIENT)
Dept: ADMINISTRATIVE | Facility: HOSPITAL | Age: 39
End: 2019-03-21

## 2019-03-21 DIAGNOSIS — Z12.31 VISIT FOR SCREENING MAMMOGRAM: Primary | ICD-10-CM

## 2019-04-09 ENCOUNTER — OFFICE VISIT (OUTPATIENT)
Dept: FAMILY MEDICINE CLINIC | Facility: CLINIC | Age: 39
End: 2019-04-09

## 2019-04-09 VITALS
DIASTOLIC BLOOD PRESSURE: 80 MMHG | HEIGHT: 65 IN | TEMPERATURE: 97.4 F | WEIGHT: 151.4 LBS | SYSTOLIC BLOOD PRESSURE: 110 MMHG | HEART RATE: 59 BPM | OXYGEN SATURATION: 99 % | BODY MASS INDEX: 25.22 KG/M2

## 2019-04-09 DIAGNOSIS — M54.6 CHRONIC MIDLINE THORACIC BACK PAIN: ICD-10-CM

## 2019-04-09 DIAGNOSIS — G89.29 CHRONIC MIDLINE THORACIC BACK PAIN: ICD-10-CM

## 2019-04-09 DIAGNOSIS — N62 MACROMASTIA: ICD-10-CM

## 2019-04-09 DIAGNOSIS — Z00.00 ANNUAL PHYSICAL EXAM: Primary | ICD-10-CM

## 2019-04-09 PROCEDURE — 99396 PREV VISIT EST AGE 40-64: CPT | Performed by: FAMILY MEDICINE

## 2019-04-09 NOTE — PROGRESS NOTES
"Subjective   Sue Hall is a 38 y.o. female.     Pt is here due to upper back pain when neck and shoulders connect. Achy pain. Sometimes some sharp pain. Worse at end of day after sitting and working on computer. Pt had physical therapy back in Nov for the same pain.    History of Present Illness     The following portions of the patient's history were reviewed and updated as appropriate: allergies, current medications, past family history, past medical history, past social history, past surgical history and problem list.    Review of Systems    Objective     Vitals:    04/09/19 0914   BP: 110/80   Pulse: 59   Temp: 97.4 °F (36.3 °C)   SpO2: 99%   Weight: 68.7 kg (151 lb 6.4 oz)   Height: 165.1 cm (65\")       Physical Exam    Assessment/Plan     Problem List Items Addressed This Visit     None          "

## 2019-04-09 NOTE — PROGRESS NOTES
Subjective   Sue Hall is a 38 y.o. female and is here for a comprehensive physical exam. The patient reports problems - upper back pain persistant from 1 year.  . Patient reports last physical date of 1 year ago.  She has been seen by physical therapy and has been continuing to do the exercises.  She continues to have upper back pain most likely the result of macromastia she has shoulder grooves.    Do you take any herbs or supplements that were not prescribed by a doctor? no  Are you taking calcium supplements? yes  Are you taking aspirin daily? no  Family history of ovarian cancer? no  Family history of breast cancer? no  FH of endometrial cancer? paternal grandmother  FH of cervical cancer? no  FH of colon cancer? no    Cancer Screening  Mammogram up-to-date?  yes  If yes, last exam date: 2018  BMD up-to-date? no  If yes, last exam date: na  Colonoscopy up-to-date? no   If yes, last exam date: N/A  Pap up-to-date? yes   If yes, last exam date: 2018     History:  LMP:2019   Menopause NA   pap date: 2018  Abnormal pap? no  : 2  Para: 2    Immunization History  Tdap? yes  HPV? no  Pneumonia? yes  Shingles? yes    The following portions of the patient's history were reviewed and updated as appropriate: allergies, current medications, past family history, past medical history, past social history, past surgical history and problem list.    Past Medical History:   Diagnosis Date   • Acne    • Cough    • Headache    • Sinusitis    • Sore throat    • Upper respiratory infection        Family History   Problem Relation Age of Onset   • Uterine cancer Other    • Breast cancer Other         aunt       Past Surgical History:   Procedure Laterality Date   •  SECTION         Social History     Socioeconomic History   • Marital status:      Spouse name: Not on file   • Number of children: Not on file   • Years of education: Not on file   • Highest education level: Not on file    Tobacco Use   • Smoking status: Never Smoker   • Smokeless tobacco: Never Used   Substance and Sexual Activity   • Alcohol use: Yes     Comment: rarely   • Drug use: No   • Sexual activity: Yes     Partners: Male     Birth control/protection: Condom   Social History Narrative    Pt is . Pt has 2 children. 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       Review of Systems  Do you have pain that bothers you in your daily life? yes-upper back pain  Review of Systems   Constitutional: Negative for chills, fatigue, fever and unexpected weight change.   HENT: Negative for congestion, ear pain, nosebleeds, rhinorrhea, sinus pressure, sneezing, sore throat and trouble swallowing.    Eyes: Negative for itching and visual disturbance.   Respiratory: Negative for cough, chest tightness, shortness of breath and wheezing.    Cardiovascular: Negative for chest pain, palpitations and leg swelling.   Gastrointestinal: Negative for abdominal pain, blood in stool, constipation, diarrhea, nausea and vomiting.   Endocrine: Negative for cold intolerance and heat intolerance.   Genitourinary: Negative for difficulty urinating, frequency, hematuria and urgency.   Musculoskeletal: Positive for back pain, myalgias and neck stiffness. Negative for gait problem.        Upper Left back and shoulder pain, muscle tension noted   Skin: Negative for rash.   Neurological: Positive for numbness. Negative for dizziness, weakness and headaches.        Numbness in arm with upper back pain     Psychiatric/Behavioral: Negative for agitation, confusion and decreased concentration. The patient is not nervous/anxious.        Objective   Physical Exam   Constitutional: She is oriented to person, place, and time. She appears well-developed and well-nourished. No distress.   HENT:   Head: Normocephalic and atraumatic.   Mouth/Throat: Oropharynx is clear and moist.   Eyes: Conjunctivae, EOM and lids are normal. Pupils are equal, round, and reactive to light.    Neck: Normal range of motion. Neck supple. No JVD present. Carotid bruit is not present. No tracheal deviation present. No thyroid mass and no thyromegaly present.   Cardiovascular: Normal rate, regular rhythm, normal heart sounds and intact distal pulses. Exam reveals no gallop and no friction rub.   No murmur heard.  Pulmonary/Chest: Effort normal and breath sounds normal. No respiratory distress. She has no decreased breath sounds. She has no wheezes. She has no rhonchi. She has no rales. She exhibits no mass, no tenderness, no laceration, no deformity and no retraction.       Abdominal: Soft. Bowel sounds are normal. She exhibits no distension and no mass. There is no hepatosplenomegaly. There is no tenderness. There is no rebound, no guarding and no CVA tenderness. No hernia.   Musculoskeletal: Normal range of motion. She exhibits tenderness. She exhibits no edema.        Right shoulder: She exhibits no swelling.   Left upper back paraspinal muscle tension and tenderness to palpation      During the foot exam she had a monofilament test not performed.  Lymphadenopathy:     She has no cervical adenopathy.        Right: No supraclavicular adenopathy present.        Left: No supraclavicular adenopathy present.   Neurological: She is alert and oriented to person, place, and time. She has normal strength and normal reflexes. She displays normal reflexes. No cranial nerve deficit.   Skin: Skin is warm and dry. No bruising and no rash noted. She is not diaphoretic. No cyanosis or erythema. Nails show no clubbing.   Psychiatric: She has a normal mood and affect. Her speech is normal and behavior is normal. Judgment and thought content normal. Cognition and memory are normal.   Nursing note and vitals reviewed.       Assessment/Plan   Healthy female exam.      1.   Problem List Items Addressed This Visit        Nervous and Auditory    Chronic midline thoracic back pain    Relevant Orders    XR spine thoracic 3 vw     Ambulatory Referral to Physical Therapy Evaluate and treat    Ambulatory Referral to Orthopedic Surgery       Other    Annual physical exam - Primary    Macromastia    Relevant Orders    Ambulatory Referral to Orthopedic Surgery            2. Patient Counseling:  --Nutrition: Stressed importance of moderation in sodium/caffeine intake, saturated fat and cholesterol, caloric balance, sufficient intake of fresh fruits, vegetables, fiber, calcium, iron, and 1 mg of folate supplement per day (for females capable of pregnancy).  --Discussed the issue of estrogen replacement, calcium supplement, and the daily use of baby aspirin.  --Exercise: Stressed the importance of regular exercise.   --Substance Abuse: Discussed cessation/primary prevention of tobacco, alcohol, or other drug use; driving or other dangerous activities under the influence; availability of treatment for abuse.    --Sexuality: Discussed sexually transmitted diseases, partner selection, use of condoms, avoidance of unintended pregnancy  and contraceptive alternatives.   --Injury prevention: Discussed safety belts, safety helmets, smoke detector, smoking near bedding or upholstery.   --Dental health: Discussed importance of regular tooth brushing, flossing, and dental visits.  --Immunizations reviewed.  --Discussed benefits of screening colonoscopy.  --After hours service discussed with patient    3. Discussed the patient's BMI with her.  The BMI is in the acceptable range  4. Follow up in one year

## 2019-04-26 ENCOUNTER — APPOINTMENT (OUTPATIENT)
Dept: PHYSICAL THERAPY | Facility: HOSPITAL | Age: 39
End: 2019-04-26

## 2019-09-03 ENCOUNTER — APPOINTMENT (OUTPATIENT)
Dept: MAMMOGRAPHY | Facility: HOSPITAL | Age: 39
End: 2019-09-03

## 2019-09-13 ENCOUNTER — HOSPITAL ENCOUNTER (OUTPATIENT)
Dept: MAMMOGRAPHY | Facility: HOSPITAL | Age: 39
Discharge: HOME | End: 2019-09-13
Admitting: OBSTETRICS & GYNECOLOGY

## 2019-09-13 DIAGNOSIS — Z12.31 VISIT FOR SCREENING MAMMOGRAM: ICD-10-CM

## 2019-09-13 PROCEDURE — 77063 BREAST TOMOSYNTHESIS BI: CPT

## 2019-09-13 PROCEDURE — 77063 BREAST TOMOSYNTHESIS BI: CPT | Performed by: RADIOLOGY

## 2019-09-13 PROCEDURE — 77067 SCR MAMMO BI INCL CAD: CPT

## 2019-09-13 PROCEDURE — 77067 SCR MAMMO BI INCL CAD: CPT | Performed by: RADIOLOGY

## 2020-02-10 ENCOUNTER — OFFICE VISIT (OUTPATIENT)
Dept: FAMILY MEDICINE CLINIC | Facility: CLINIC | Age: 40
End: 2020-02-10

## 2020-02-10 VITALS
WEIGHT: 153 LBS | TEMPERATURE: 98.2 F | BODY MASS INDEX: 25.49 KG/M2 | HEART RATE: 91 BPM | OXYGEN SATURATION: 98 % | SYSTOLIC BLOOD PRESSURE: 116 MMHG | HEIGHT: 65 IN | DIASTOLIC BLOOD PRESSURE: 60 MMHG

## 2020-02-10 DIAGNOSIS — R05.9 COUGH: Primary | ICD-10-CM

## 2020-02-10 DIAGNOSIS — J06.9 UPPER RESPIRATORY TRACT INFECTION, UNSPECIFIED TYPE: ICD-10-CM

## 2020-02-10 PROCEDURE — 99213 OFFICE O/P EST LOW 20 MIN: CPT | Performed by: FAMILY MEDICINE

## 2020-02-10 PROCEDURE — 87804 INFLUENZA ASSAY W/OPTIC: CPT | Performed by: FAMILY MEDICINE

## 2020-02-10 RX ORDER — BENZONATATE 200 MG/1
200 CAPSULE ORAL 3 TIMES DAILY PRN
Qty: 45 CAPSULE | Refills: 0 | Status: SHIPPED | OUTPATIENT
Start: 2020-02-10 | End: 2020-02-11 | Stop reason: SDUPTHER

## 2020-02-10 NOTE — PROGRESS NOTES
"Subjective   Sue Hall is a 39 y.o. female.     Pt complains of cough, head congestions, chills that started last Wednesday and difficulty talking.    URI    This is a new problem. The current episode started yesterday. The problem has been waxing and waning. There has been no fever. Associated symptoms include congestion, coughing, headaches, a plugged ear sensation and rhinorrhea. Pertinent negatives include no abdominal pain, chest pain, diarrhea, dysuria, ear pain, joint swelling, nausea, neck pain, rash or sinus pain. She has tried nothing for the symptoms. The treatment provided no relief.    son had similar sx last week. Sx onset Thursday and felt like sx improving.  Sx worsened Sunday.  pnd and congestion.  No body aches.  She had flu shot.    Strept and flu neg in son.      The following portions of the patient's history were reviewed and updated as appropriate: allergies, current medications, past family history, past medical history, past social history, past surgical history and problem list.    Review of Systems   Constitutional: Negative.    HENT: Positive for congestion and rhinorrhea. Negative for ear pain and sinus pain.    Eyes: Negative.    Respiratory: Positive for cough.    Cardiovascular: Negative.  Negative for chest pain and leg swelling.   Gastrointestinal: Negative.  Negative for abdominal pain, diarrhea and nausea.   Endocrine: Negative.    Genitourinary: Negative.  Negative for dysuria.   Musculoskeletal: Negative.  Negative for neck pain.   Skin: Negative.  Negative for rash.   Allergic/Immunologic: Negative.    Neurological: Positive for headaches.   Hematological: Negative.    Psychiatric/Behavioral: Negative.    All other systems reviewed and are negative.      Objective     Vitals:    02/10/20 1544   BP: 116/60   Pulse: 91   Temp: 98.2 °F (36.8 °C)   SpO2: 98%   Weight: 69.4 kg (153 lb)   Height: 165.1 cm (65\")       Physical Exam   Constitutional: She is oriented to person, " place, and time. She appears well-developed and well-nourished.   HENT:   Head: Normocephalic and atraumatic.   Right Ear: External ear normal.   Left Ear: External ear normal.   Nose: Nose normal.   Mouth/Throat: Oropharynx is clear and moist. No oropharyngeal exudate.   Eyes: Pupils are equal, round, and reactive to light. EOM are normal. Right eye exhibits no discharge. Left eye exhibits no discharge.   Neck: Normal range of motion. Neck supple.   Cardiovascular: Normal rate, regular rhythm, normal heart sounds and intact distal pulses.   Pulmonary/Chest: Effort normal and breath sounds normal.   Abdominal: Soft. Bowel sounds are normal. She exhibits no mass. There is no tenderness.   Musculoskeletal: Normal range of motion.        Right shoulder: She exhibits no swelling.   Neurological: She is alert and oriented to person, place, and time. She has normal reflexes.   Skin: Skin is warm and dry. No cyanosis. Nails show no clubbing.   Psychiatric: She has a normal mood and affect. Her behavior is normal. Judgment and thought content normal.   Nursing note and vitals reviewed.      Assessment/Plan     Problem List Items Addressed This Visit        Respiratory    Cough - Primary    Relevant Orders    POC Influenza A / B    Upper respiratory tract infection    Relevant Orders    POC Influenza A / B        Over the counter Tylenol ibuprofen Delsym increase fluids

## 2020-02-11 ENCOUNTER — TELEPHONE (OUTPATIENT)
Dept: FAMILY MEDICINE CLINIC | Facility: CLINIC | Age: 40
End: 2020-02-11

## 2020-02-11 DIAGNOSIS — R05.9 COUGH: ICD-10-CM

## 2020-02-11 RX ORDER — BENZONATATE 200 MG/1
200 CAPSULE ORAL 3 TIMES DAILY PRN
Qty: 45 CAPSULE | Refills: 0 | Status: SHIPPED | OUTPATIENT
Start: 2020-02-11 | End: 2021-09-22

## 2020-02-11 NOTE — TELEPHONE ENCOUNTER
PT  CALLED [facility] WAS SUPPOSE TO BE CALLED IN, WAS AT THE OFFICE YESTERDAY WAS TOLD IT WOULD BE CALLED IN AND IS NOT. PLEASE ADVISE.    VERIFIED PHARMACY    PT CALL BACK [phone]

## 2020-02-19 LAB
EXPIRATION DATE: NORMAL
FLUAV AG NPH QL: NEGATIVE
FLUBV AG NPH QL: NEGATIVE
INTERNAL CONTROL: NORMAL
Lab: NORMAL

## 2020-08-03 ENCOUNTER — TRANSCRIBE ORDERS (OUTPATIENT)
Dept: ADMINISTRATIVE | Facility: HOSPITAL | Age: 40
End: 2020-08-03

## 2020-08-03 DIAGNOSIS — Z12.31 VISIT FOR SCREENING MAMMOGRAM: Primary | ICD-10-CM

## 2020-09-02 ENCOUNTER — TELEPHONE (OUTPATIENT)
Dept: OBSTETRICS AND GYNECOLOGY | Facility: CLINIC | Age: 40
End: 2020-09-02

## 2020-09-02 RX ORDER — FLUCONAZOLE 150 MG/1
TABLET ORAL
Qty: 2 TABLET | Refills: 0 | Status: SHIPPED | OUTPATIENT
Start: 2020-09-02 | End: 2021-09-22

## 2020-09-02 NOTE — TELEPHONE ENCOUNTER
Patient has been experiencing itching around her breasts and underarms since Sunday. She does have redness under both breasts, unsure if this is from itching or part of the problem.

## 2020-09-02 NOTE — TELEPHONE ENCOUNTER
Patient states itching on both breasts and redness underneath breasts. Probable yeast and patient to use diflucan as directed. If no improvement will schedule appointment for breast evaluation in one week. Normal mammogram in 10/2019.

## 2020-09-16 ENCOUNTER — OFFICE VISIT (OUTPATIENT)
Dept: FAMILY MEDICINE CLINIC | Facility: CLINIC | Age: 40
End: 2020-09-16

## 2020-09-16 VITALS
SYSTOLIC BLOOD PRESSURE: 118 MMHG | WEIGHT: 157.4 LBS | BODY MASS INDEX: 26.23 KG/M2 | HEART RATE: 55 BPM | HEIGHT: 65 IN | TEMPERATURE: 98.4 F | DIASTOLIC BLOOD PRESSURE: 84 MMHG | RESPIRATION RATE: 16 BRPM | OXYGEN SATURATION: 99 %

## 2020-09-16 DIAGNOSIS — Z00.00 ANNUAL PHYSICAL EXAM: Primary | ICD-10-CM

## 2020-09-16 DIAGNOSIS — Z23 NEED FOR DIPHTHERIA-TETANUS-PERTUSSIS (TDAP) VACCINE: ICD-10-CM

## 2020-09-16 DIAGNOSIS — N62 MACROMASTIA: ICD-10-CM

## 2020-09-16 DIAGNOSIS — Z23 NEEDS FLU SHOT: ICD-10-CM

## 2020-09-16 PROCEDURE — 90715 TDAP VACCINE 7 YRS/> IM: CPT | Performed by: FAMILY MEDICINE

## 2020-09-16 PROCEDURE — 84443 ASSAY THYROID STIM HORMONE: CPT | Performed by: FAMILY MEDICINE

## 2020-09-16 PROCEDURE — 86803 HEPATITIS C AB TEST: CPT | Performed by: FAMILY MEDICINE

## 2020-09-16 PROCEDURE — 99396 PREV VISIT EST AGE 40-64: CPT | Performed by: FAMILY MEDICINE

## 2020-09-16 PROCEDURE — 80061 LIPID PANEL: CPT | Performed by: FAMILY MEDICINE

## 2020-09-16 PROCEDURE — 90471 IMMUNIZATION ADMIN: CPT | Performed by: FAMILY MEDICINE

## 2020-09-16 PROCEDURE — 82607 VITAMIN B-12: CPT | Performed by: FAMILY MEDICINE

## 2020-09-16 PROCEDURE — 80053 COMPREHEN METABOLIC PANEL: CPT | Performed by: FAMILY MEDICINE

## 2020-09-16 PROCEDURE — 82306 VITAMIN D 25 HYDROXY: CPT | Performed by: FAMILY MEDICINE

## 2020-09-16 PROCEDURE — 85025 COMPLETE CBC W/AUTO DIFF WBC: CPT | Performed by: FAMILY MEDICINE

## 2020-09-16 PROCEDURE — 90686 IIV4 VACC NO PRSV 0.5 ML IM: CPT | Performed by: FAMILY MEDICINE

## 2020-09-16 PROCEDURE — 90472 IMMUNIZATION ADMIN EACH ADD: CPT | Performed by: FAMILY MEDICINE

## 2020-09-16 PROCEDURE — 83036 HEMOGLOBIN GLYCOSYLATED A1C: CPT | Performed by: FAMILY MEDICINE

## 2020-09-16 NOTE — PROGRESS NOTES
Subjective   Sue Hall is a 40 y.o. female and is here for a comprehensive physical exam. The patient reports no problems. Patient reports last physical date of over 1 year.    3 weeks ago was having slight itchy rash under breasts.  Resolved with diflucan.  She also has continued to have upper back thoracic pain and is seriously considering breast reduction surgery.  She has already seen physical therapy with mild alleviation of her symptoms.  She continues to have grooves in her shoulders from her bra straps.    Pt is not fasting today.  Would like flu shot today.    Do you take any herbs or supplements that were not prescribed by a doctor? no  Are you taking calcium supplements? no  Are you taking aspirin daily? no  Family history of ovarian cancer? no  Family history of breast cancer? no  FH of endometrial cancer? yes  FH of cervical cancer? no  FH of colon cancer? no    Cancer Screening  Mammogram up-to-date?  yes  If yes, last exam date: next due 2020  BMD up-to-date? no  If yes, last exam date: na  Colonoscopy up-to-date? no   If yes, last exam date: na  Pap up-to-date? yes   If yes, last exam date: per gyn Aguilar     History:  LMP: Patient's last menstrual period was 2020 (approximate).  Menopause at na years  Last pap date: per [place]  Abnormal pap? no  : 2  Para: 2    Immunization History  Tdap? will do today  HPV? not applicable  Pneumonia? not applicable  Shingles? not applicable    The following portions of the patient's history were reviewed and updated as appropriate: allergies, current medications, past family history, past medical history, past social history, past surgical history and problem list.    Past Medical History:   Diagnosis Date   • Acne    • Cough    • Headache    • Sinusitis    • Sore throat    • Upper respiratory infection        Family History   Problem Relation Age of Onset   • Uterine cancer Other    • Breast cancer Other         aunt       Past Surgical  History:   Procedure Laterality Date   •  SECTION         Social History     Socioeconomic History   • Marital status:      Spouse name: Not on file   • Number of children: Not on file   • Years of education: Not on file   • Highest education level: Not on file   Tobacco Use   • Smoking status: Never Smoker   • Smokeless tobacco: Never Used   Substance and Sexual Activity   • Alcohol use: Yes     Comment: rarely   • Drug use: No   • Sexual activity: Yes     Partners: Male     Birth control/protection: Condom   Social History Narrative    Pt is . Pt has 2 children. 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       Review of Systems  Do you have pain that bothers you in your daily life? no  Review of Systems   Constitutional: Negative.    HENT: Negative.    Eyes: Negative.    Respiratory: Negative.    Cardiovascular: Negative.    Gastrointestinal: Negative.    Endocrine: Negative.    Genitourinary: Negative.    Musculoskeletal: Positive for back pain. Negative for arthralgias.   Skin: Positive for rash.   Allergic/Immunologic: Negative.    Neurological: Negative.    Hematological: Negative.    Psychiatric/Behavioral: Negative.    All other systems reviewed and are negative.      Objective   Physical Exam  Vitals signs and nursing note reviewed.   Constitutional:       Appearance: She is well-developed.   HENT:      Head: Normocephalic and atraumatic.   Eyes:      General:         Right eye: No discharge.         Left eye: No discharge.      Pupils: Pupils are equal, round, and reactive to light.   Neck:      Musculoskeletal: Normal range of motion and neck supple.   Cardiovascular:      Rate and Rhythm: Normal rate and regular rhythm.      Heart sounds: Normal heart sounds.   Pulmonary:      Effort: Pulmonary effort is normal.      Breath sounds: Normal breath sounds.   Abdominal:      General: Bowel sounds are normal.      Palpations: Abdomen is soft. There is no mass.      Tenderness: There is no  abdominal tenderness.   Musculoskeletal: Normal range of motion.      Right shoulder: She exhibits no swelling.   Skin:     General: Skin is warm and dry.      Nails: There is no clubbing.     Neurological:      Mental Status: She is alert and oriented to person, place, and time.      Deep Tendon Reflexes: Reflexes are normal and symmetric.   Psychiatric:         Behavior: Behavior normal.         Thought Content: Thought content normal.         Judgment: Judgment normal.          Assessment/Plan   Healthy female exam.      1.   Problem List Items Addressed This Visit        Other    Annual physical exam - Primary    Relevant Orders    CBC & Differential    TSH    Comprehensive Metabolic Panel    Hemoglobin A1c    Lipid Panel    Vitamin D 25 Hydroxy    Vitamin B12    POC Urinalysis Dipstick, Automated    Hepatitis C Antibody    CBC Auto Differential    Macromastia    Relevant Orders    Ambulatory Referral to Plastic Surgery      Other Visit Diagnoses     Needs flu shot        Relevant Orders    Fluarix Quad >6 Months (1764-7642) (Completed)    Need for diphtheria-tetanus-pertussis (Tdap) vaccine        Relevant Orders    Tdap Vaccine Greater Than or Equal To 8yo IM (Completed)            2. Patient Counseling:  --Nutrition: Stressed importance of moderation in sodium/caffeine intake, saturated fat and cholesterol, caloric balance, sufficient intake of fresh fruits, vegetables, fiber, calcium, iron, and 1 mg of folate supplement per day (for females capable of pregnancy).  --Discussed the issue of estrogen replacement, calcium supplement, and the daily use of baby aspirin.  --Exercise: Stressed the importance of regular exercise.   --Substance Abuse: Discussed cessation/primary prevention of tobacco, alcohol, or other drug use; driving or other dangerous activities under the influence; availability of treatment for abuse.    --Sexuality: Discussed sexually transmitted diseases, partner selection, use of condoms,  avoidance of unintended pregnancy  and contraceptive alternatives.   --Injury prevention: Discussed safety belts, safety helmets, smoke detector, smoking near bedding or upholstery.   --Dental health: Discussed importance of regular tooth brushing, flossing, and dental visits.  --Immunizations reviewed.  --Discussed benefits of screening colonoscopy.  --After hours service discussed with patient    3. Discussed the patient's BMI with her.  The BMI is in the acceptable range  4. Follow up in one year

## 2020-09-17 LAB
25(OH)D3 SERPL-MCNC: 29.7 NG/ML (ref 30–100)
ALBUMIN SERPL-MCNC: 4.5 G/DL (ref 3.5–5.2)
ALBUMIN/GLOB SERPL: 1.6 G/DL
ALP SERPL-CCNC: 58 U/L (ref 39–117)
ALT SERPL W P-5'-P-CCNC: 18 U/L (ref 1–33)
ANION GAP SERPL CALCULATED.3IONS-SCNC: 10.6 MMOL/L (ref 5–15)
AST SERPL-CCNC: 17 U/L (ref 1–32)
BASOPHILS # BLD AUTO: 0.02 10*3/MM3 (ref 0–0.2)
BASOPHILS NFR BLD AUTO: 0.3 % (ref 0–1.5)
BILIRUB SERPL-MCNC: 0.5 MG/DL (ref 0–1.2)
BUN SERPL-MCNC: 11 MG/DL (ref 6–20)
BUN/CREAT SERPL: 15.7 (ref 7–25)
CALCIUM SPEC-SCNC: 10.1 MG/DL (ref 8.6–10.5)
CHLORIDE SERPL-SCNC: 102 MMOL/L (ref 98–107)
CHOLEST SERPL-MCNC: 151 MG/DL (ref 0–200)
CO2 SERPL-SCNC: 25.4 MMOL/L (ref 22–29)
CREAT SERPL-MCNC: 0.7 MG/DL (ref 0.57–1)
DEPRECATED RDW RBC AUTO: 41.3 FL (ref 37–54)
EOSINOPHIL # BLD AUTO: 0.17 10*3/MM3 (ref 0–0.4)
EOSINOPHIL NFR BLD AUTO: 2.6 % (ref 0.3–6.2)
ERYTHROCYTE [DISTWIDTH] IN BLOOD BY AUTOMATED COUNT: 12.5 % (ref 12.3–15.4)
GFR SERPL CREATININE-BSD FRML MDRD: 92 ML/MIN/1.73
GLOBULIN UR ELPH-MCNC: 2.8 GM/DL
GLUCOSE SERPL-MCNC: 68 MG/DL (ref 65–99)
HBA1C MFR BLD: 4.7 % (ref 4.8–5.6)
HCT VFR BLD AUTO: 39 % (ref 34–46.6)
HCV AB SER DONR QL: NORMAL
HDLC SERPL-MCNC: 57 MG/DL (ref 40–60)
HGB BLD-MCNC: 13.4 G/DL (ref 12–15.9)
IMM GRANULOCYTES # BLD AUTO: 0.02 10*3/MM3 (ref 0–0.05)
IMM GRANULOCYTES NFR BLD AUTO: 0.3 % (ref 0–0.5)
LDLC SERPL CALC-MCNC: 81 MG/DL (ref 0–100)
LDLC/HDLC SERPL: 1.43 {RATIO}
LYMPHOCYTES # BLD AUTO: 2.59 10*3/MM3 (ref 0.7–3.1)
LYMPHOCYTES NFR BLD AUTO: 38.9 % (ref 19.6–45.3)
MCH RBC QN AUTO: 30.6 PG (ref 26.6–33)
MCHC RBC AUTO-ENTMCNC: 34.4 G/DL (ref 31.5–35.7)
MCV RBC AUTO: 89 FL (ref 79–97)
MONOCYTES # BLD AUTO: 0.48 10*3/MM3 (ref 0.1–0.9)
MONOCYTES NFR BLD AUTO: 7.2 % (ref 5–12)
NEUTROPHILS NFR BLD AUTO: 3.38 10*3/MM3 (ref 1.7–7)
NEUTROPHILS NFR BLD AUTO: 50.7 % (ref 42.7–76)
NRBC BLD AUTO-RTO: 0 /100 WBC (ref 0–0.2)
PLATELET # BLD AUTO: 322 10*3/MM3 (ref 140–450)
PMV BLD AUTO: 10.8 FL (ref 6–12)
POTASSIUM SERPL-SCNC: 4.4 MMOL/L (ref 3.5–5.2)
PROT SERPL-MCNC: 7.3 G/DL (ref 6–8.5)
RBC # BLD AUTO: 4.38 10*6/MM3 (ref 3.77–5.28)
SODIUM SERPL-SCNC: 138 MMOL/L (ref 136–145)
TRIGL SERPL-MCNC: 63 MG/DL (ref 0–150)
TSH SERPL DL<=0.05 MIU/L-ACNC: 1.96 UIU/ML (ref 0.27–4.2)
VIT B12 BLD-MCNC: 482 PG/ML (ref 211–946)
VLDLC SERPL-MCNC: 12.6 MG/DL (ref 5–40)
WBC # BLD AUTO: 6.66 10*3/MM3 (ref 3.4–10.8)

## 2020-11-23 ENCOUNTER — HOSPITAL ENCOUNTER (OUTPATIENT)
Dept: MAMMOGRAPHY | Facility: HOSPITAL | Age: 40
Discharge: HOME | End: 2020-11-23
Admitting: OBSTETRICS & GYNECOLOGY

## 2020-11-23 DIAGNOSIS — Z12.31 VISIT FOR SCREENING MAMMOGRAM: ICD-10-CM

## 2020-11-23 PROCEDURE — 77063 BREAST TOMOSYNTHESIS BI: CPT | Performed by: RADIOLOGY

## 2020-11-23 PROCEDURE — 77063 BREAST TOMOSYNTHESIS BI: CPT

## 2020-11-23 PROCEDURE — 77067 SCR MAMMO BI INCL CAD: CPT | Performed by: RADIOLOGY

## 2020-11-23 PROCEDURE — 77067 SCR MAMMO BI INCL CAD: CPT

## 2021-07-12 ENCOUNTER — OFFICE VISIT (OUTPATIENT)
Dept: OBSTETRICS AND GYNECOLOGY | Facility: CLINIC | Age: 41
End: 2021-07-12

## 2021-07-12 VITALS
DIASTOLIC BLOOD PRESSURE: 70 MMHG | HEIGHT: 65 IN | SYSTOLIC BLOOD PRESSURE: 120 MMHG | BODY MASS INDEX: 26.06 KG/M2 | WEIGHT: 156.4 LBS

## 2021-07-12 DIAGNOSIS — Z01.419 WOMEN'S ANNUAL ROUTINE GYNECOLOGICAL EXAMINATION: Primary | ICD-10-CM

## 2021-07-12 DIAGNOSIS — E66.3 OVERWEIGHT (BMI 25.0-29.9): ICD-10-CM

## 2021-07-12 DIAGNOSIS — M54.6 CHRONIC BILATERAL THORACIC BACK PAIN: ICD-10-CM

## 2021-07-12 DIAGNOSIS — G89.29 CHRONIC BILATERAL THORACIC BACK PAIN: ICD-10-CM

## 2021-07-12 PROCEDURE — 99212 OFFICE O/P EST SF 10 MIN: CPT | Performed by: NURSE PRACTITIONER

## 2021-07-12 PROCEDURE — 99396 PREV VISIT EST AGE 40-64: CPT | Performed by: NURSE PRACTITIONER

## 2021-07-12 NOTE — PROGRESS NOTES
GYN Annual Exam     CC - Here for annual exam.        SERGIO Hall is a 40 y.o. female, , who presents for annual well woman exam. Patient's last menstrual period was 2021..  Periods are regular every 25-35 days, lasting 5 days. .  Dysmenorrhea:moderate, occurring first 1-2 days of flow.  Patient would like to discuss options to get a breast reduction having back pain. There were no changes to her medical or surgical history since her last visit.. Partner Status: Marital Status: .  New Partners since last visit: no.  Desires STD Screening: no.    Pt states she is interested in a breast reduction due to chronic issues with back pain. Pt states pain is located in the upper portion of there back, and comes and goes. Pt states when occurring it is severe. This has been an ongoing for the last 4-5 years and she has discussed with Dr Aguilar in the past. 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 Pt states she exercises/runs every other day. She has never completed a weight loss program. She tried PT approx 3-4 years ago with minimal relief of back pain. Last mammogram 2020, normal.     Additional OB/GYN History   Current contraception: contraceptive methods: None  Desires to: do not start contraception  Last Pap :   Last Completed Pap Smear     This patient has no relevant Health Maintenance data.        History of abnormal Pap smear: no  Family history of uterine, colon, breast, or ovarian cancer: yes - Yes PGM uterine   Performs monthly Self-Breast Exam: yes  Last mammogram: 2020 per pt normal results   Last Completed Mammogram     This patient has no relevant Health Maintenance data.         Exercises Regularly: yes  Feelings of Anxiety or Depression: no  Tobacco Usage?: No   OB History        2    Para   2    Term   2            AB        Living   2       SAB        TAB        Ectopic        Molar        Multiple  "       Live Births                    Health Maintenance   Topic Date Due   • Annual Gynecologic Pelvic and Breast Exam  Never done   • PAP SMEAR  03/14/2021   • INFLUENZA VACCINE  08/01/2021   • ANNUAL PHYSICAL  09/17/2021   • TDAP/TD VACCINES (3 - Td or Tdap) 09/16/2030   • HEPATITIS C SCREENING  Completed   • COVID-19 Vaccine  Completed   • Pneumococcal Vaccine 0-64  Aged Out       The additional following portions of the patient's history were reviewed and updated as appropriate: allergies, current medications, past family history, past medical history, past social history, past surgical history and problem list.    Review of Systems   Constitutional: Negative.    HENT: Negative.    Eyes: Negative.    Respiratory: Negative.    Cardiovascular: Negative.    Gastrointestinal: Negative.    Endocrine: Negative.    Genitourinary: Negative.    Musculoskeletal: Negative.    Skin: Negative.    Allergic/Immunologic: Negative.    Neurological: Negative.    Hematological: Negative.    Psychiatric/Behavioral: Negative.          I have reviewed and agree with the HPI, ROS, and historical information as entered above. Estefany Ortiz, APRN    Objective   /70   Ht 165.1 cm (65\")   Wt 70.9 kg (156 lb 6.4 oz)   LMP 07/01/2021   Breastfeeding No   BMI 26.03 kg/m²     Physical Exam  Vitals and nursing note reviewed. Exam conducted with a chaperone present.   Constitutional:       Appearance: She is well-developed.   HENT:      Head: Normocephalic and atraumatic.   Neck:      Thyroid: No thyroid mass or thyromegaly.   Cardiovascular:      Rate and Rhythm: Normal rate and regular rhythm.      Heart sounds: No murmur heard.     Pulmonary:      Effort: Pulmonary effort is normal. No retractions.      Breath sounds: Normal breath sounds. No wheezing, rhonchi or rales.   Chest:      Chest wall: No mass or tenderness.      Breasts:         Right: Normal. No mass, nipple discharge, skin change or tenderness.         Left: Normal. No " mass, nipple discharge, skin change or tenderness.   Abdominal:      General: Bowel sounds are normal.      Palpations: Abdomen is soft. Abdomen is not rigid. There is no mass.      Tenderness: There is no abdominal tenderness. There is no guarding.      Hernia: No hernia is present. There is no hernia in the left inguinal area.   Genitourinary:     Labia:         Right: No rash, tenderness or lesion.         Left: No rash, tenderness or lesion.       Vagina: Normal. No vaginal discharge or lesions.      Cervix: No cervical motion tenderness, discharge, lesion or cervical bleeding.      Uterus: Normal. Not enlarged, not fixed and not tender.       Adnexa:         Right: No mass or tenderness.          Left: No mass or tenderness.        Rectum: No external hemorrhoid.   Musculoskeletal:      Cervical back: Normal range of motion. No muscular tenderness.   Neurological:      Mental Status: She is alert and oriented to person, place, and time.   Psychiatric:         Behavior: Behavior normal.            Assessment and Plan    Problem List Items Addressed This Visit     None      Visit Diagnoses     Women's annual routine gynecological examination    -  Primary    Relevant Orders    Pap IG, Rfx HPV ASCU    Overweight (BMI 25.0-29.9)        Chronic bilateral thoracic back pain              1. GYN annual well woman exam.   2. Reviewed monthly self breast exams.  Instructed to call with lumps, pain, or breast discharge.    3. Recommended use of Vitamin D replacement and getting adequate calcium in her diet. (1500mg)  4. Reviewed BMI and weight loss as preventative health measures.   5. RTC in 1 year or PRN with problems.  6. Other: Pt desires breast reduction (see HPI). Encouraged to consult with a plastic surgeon.     Estefany Ortiz, GORDY  07/12/2021

## 2021-07-21 ENCOUNTER — TELEPHONE (OUTPATIENT)
Dept: OBSTETRICS AND GYNECOLOGY | Facility: CLINIC | Age: 41
End: 2021-07-21

## 2021-07-21 NOTE — TELEPHONE ENCOUNTER
Reviewed pap smear results with patient. Thoroughly reviewed the purpose of colposcopies and the colposcopy procedure process. Answered patient questions; verbalized understanding. Patient and MD schedules conflicting with respective vacations; colposcopy scheduled for 7/22/21 at 0900 with [place].

## 2021-07-22 ENCOUNTER — OFFICE VISIT (OUTPATIENT)
Dept: OBSTETRICS AND GYNECOLOGY | Facility: CLINIC | Age: 41
End: 2021-07-22

## 2021-07-22 VITALS
WEIGHT: 156 LBS | DIASTOLIC BLOOD PRESSURE: 70 MMHG | HEIGHT: 65 IN | BODY MASS INDEX: 25.99 KG/M2 | SYSTOLIC BLOOD PRESSURE: 114 MMHG

## 2021-07-22 DIAGNOSIS — Z71.85 VACCINE COUNSELING: ICD-10-CM

## 2021-07-22 DIAGNOSIS — R87.610 ATYPICAL SQUAMOUS CELLS OF UNDETERMINED SIGNIFICANCE ON CYTOLOGIC SMEAR OF CERVIX (ASC-US): ICD-10-CM

## 2021-07-22 DIAGNOSIS — E89.0 HYPOTHYROIDISM ASSOCIATED WITH SURGICAL PROCEDURE: Primary | ICD-10-CM

## 2021-07-22 LAB
B-HCG UR QL: NEGATIVE
INTERNAL NEGATIVE CONTROL: NEGATIVE
INTERNAL POSITIVE CONTROL: POSITIVE
Lab: NORMAL

## 2021-07-22 PROCEDURE — 90471 IMMUNIZATION ADMIN: CPT | Performed by: OBSTETRICS & GYNECOLOGY

## 2021-07-22 PROCEDURE — 81025 URINE PREGNANCY TEST: CPT | Performed by: OBSTETRICS & GYNECOLOGY

## 2021-07-22 PROCEDURE — 99212 OFFICE O/P EST SF 10 MIN: CPT | Performed by: OBSTETRICS & GYNECOLOGY

## 2021-07-22 PROCEDURE — 90651 9VHPV VACCINE 2/3 DOSE IM: CPT | Performed by: OBSTETRICS & GYNECOLOGY

## 2021-07-22 NOTE — PROGRESS NOTES
"Subjective   Chief Complaint   Patient presents with   • Colposcopy     Sue Hall is a 40 y.o. year old  presentingfor colposcopy.  She reports no risk factors.  She feels she nor her  have had another partner and the test may be wrong.        tObjective   /70   Ht 165.1 cm (65\")   Wt 70.8 kg (156 lb)   LMP 2021   Breastfeeding No   BMI 25.96 kg/m²     General:  well developed; well nourished  no acute distress   Skin:  No suspicious lesions seen   Pelvis: Clinical staff was present for exam     Lab Review   No data reviewed    Imaging   No data reviewed       Assessment   1. Abnormal pap with no risk factors.      Plan   1. Hold on colposcopy and repeat HPV only today.    2. Desires HPV vaccine    New Medications Ordered This Visit   Medications   • HPV 9-Valent Recomb Vaccine suspension 0.5 mL          This note was electronically signed.    Ephraim Perrin M.D.  2021    Note: Speech recognition transcription software may have been used to create portions of this document.  An attempt at proofreading has been made but errors in transcription could still be present.  "

## 2021-07-23 ENCOUNTER — TELEPHONE (OUTPATIENT)
Dept: OBSTETRICS AND GYNECOLOGY | Facility: CLINIC | Age: 41
End: 2021-07-23

## 2021-07-23 NOTE — TELEPHONE ENCOUNTER
Spoke to the patient and informed her that the results from her HPV DNA test yesterday had not resulted. Patient WILLIAM.

## 2021-07-27 ENCOUNTER — TELEPHONE (OUTPATIENT)
Dept: OBSTETRICS AND GYNECOLOGY | Facility: CLINIC | Age: 41
End: 2021-07-27

## 2021-07-27 NOTE — TELEPHONE ENCOUNTER
Patient states she checked my chart for labs that were done last Thursday and did not see anything, would like us to check on this and call back

## 2021-08-02 ENCOUNTER — TELEPHONE (OUTPATIENT)
Dept: OBSTETRICS AND GYNECOLOGY | Facility: CLINIC | Age: 41
End: 2021-08-02

## 2021-08-02 DIAGNOSIS — R87.610 ATYPICAL SQUAMOUS CELLS OF UNDETERMINED SIGNIFICANCE ON CYTOLOGIC SMEAR OF CERVIX (ASC-US): ICD-10-CM

## 2021-08-17 ENCOUNTER — OFFICE VISIT (OUTPATIENT)
Dept: OBSTETRICS AND GYNECOLOGY | Facility: CLINIC | Age: 41
End: 2021-08-17

## 2021-08-17 VITALS
BODY MASS INDEX: 25.83 KG/M2 | DIASTOLIC BLOOD PRESSURE: 68 MMHG | HEIGHT: 65 IN | WEIGHT: 155 LBS | SYSTOLIC BLOOD PRESSURE: 102 MMHG

## 2021-08-17 DIAGNOSIS — R87.610 ATYPICAL SQUAMOUS CELLS OF UNDETERMINED SIGNIFICANCE ON CYTOLOGIC SMEAR OF CERVIX (ASC-US): Primary | ICD-10-CM

## 2021-08-17 LAB
B-HCG UR QL: NEGATIVE
INTERNAL NEGATIVE CONTROL: NORMAL
INTERNAL POSITIVE CONTROL: NORMAL
Lab: NORMAL

## 2021-08-17 PROCEDURE — 81025 URINE PREGNANCY TEST: CPT | Performed by: OBSTETRICS & GYNECOLOGY

## 2021-08-17 PROCEDURE — 57454 BX/CURETT OF CERVIX W/SCOPE: CPT | Performed by: OBSTETRICS & GYNECOLOGY

## 2021-08-17 NOTE — PROGRESS NOTES
Colposcopy Procedure Note  Procedures    Indications: Sue Hall is a 41 y.o. female, , whose LMP indicated follow up for evaluation of an abnormal pap smear. She understands the need for the procedure and is aware of the complications, including post-colposcopic vaginal bleeding, vaginal leukorrhea or cervicitis.  She is aware she may experience discomfort.  After being presented with the risk, benefits, and alternatives the patient wished to proceed.      Most recent Pap smear showed: atypical squamous cellularity of undetermined significance (ASCUS).  Repeat HPV typing +non 16/18 HPV on 21    Prior cervical/vaginal disease: normal exam without visible pathology.    Prior cervical treatment: no treatment.    Procedure Details   The risks and benefits of the procedure and Verbal informed consent obtained.  She was positioned in the dorsal lithotomy position and a speculum was inserted into the vagina and excellent visualization of cervix achieved, cervix swabbed x 3 with acetic acid solution and Lugol's solution. The transformation zone was completely visualized.  A cervical biopsy was obtained.  An endocervical curettage was performed.  This colposcopy was satisfactory.   The patient tolerated the procedure well.    Findings:  The procedure was notable for:  Cervix: minimal HPV changes  Vaginal inspection: normal without visible lesions.  Vulvar colposcopy: vulvar colposcopy not performed.    Complications: none.      Plan:  1. Specimens labelled and sent to Pathology.  2. Will base further treatment on Pathology findings.  3. Has started the HPV vaccine    Kate Aguilar MD  2021

## 2021-08-24 DIAGNOSIS — R87.610 ATYPICAL SQUAMOUS CELLS OF UNDETERMINED SIGNIFICANCE ON CYTOLOGIC SMEAR OF CERVIX (ASC-US): ICD-10-CM

## 2021-08-25 ENCOUNTER — TELEPHONE (OUTPATIENT)
Dept: OBSTETRICS AND GYNECOLOGY | Facility: CLINIC | Age: 41
End: 2021-08-25

## 2021-08-25 NOTE — TELEPHONE ENCOUNTER
Informed patient of non-dyspasia on colposcopy, but HPV effect. As MD has not yet reviewed results, she can expect another phone call with appropriate follow up. Verbalized understanding.

## 2021-09-02 ENCOUNTER — TELEPHONE (OUTPATIENT)
Dept: OBSTETRICS AND GYNECOLOGY | Facility: CLINIC | Age: 41
End: 2021-09-02

## 2021-09-22 ENCOUNTER — OFFICE VISIT (OUTPATIENT)
Dept: FAMILY MEDICINE CLINIC | Facility: CLINIC | Age: 41
End: 2021-09-22

## 2021-09-22 VITALS
HEIGHT: 65 IN | DIASTOLIC BLOOD PRESSURE: 74 MMHG | HEART RATE: 72 BPM | SYSTOLIC BLOOD PRESSURE: 114 MMHG | BODY MASS INDEX: 25.66 KG/M2 | RESPIRATION RATE: 18 BRPM | OXYGEN SATURATION: 98 % | WEIGHT: 154 LBS | TEMPERATURE: 98.1 F

## 2021-09-22 DIAGNOSIS — Z00.00 ANNUAL PHYSICAL EXAM: Primary | Chronic | ICD-10-CM

## 2021-09-22 DIAGNOSIS — N62 MACROMASTIA: ICD-10-CM

## 2021-09-22 DIAGNOSIS — G89.29 CHRONIC MIDLINE THORACIC BACK PAIN: ICD-10-CM

## 2021-09-22 DIAGNOSIS — Z23 NEED FOR PROPHYLACTIC VACCINATION AGAINST HUMAN PAPILLOMAVIRUS (HPV) TYPES 6, 11, 16, AND 18: ICD-10-CM

## 2021-09-22 DIAGNOSIS — M54.6 CHRONIC MIDLINE THORACIC BACK PAIN: ICD-10-CM

## 2021-09-22 PROCEDURE — 90471 IMMUNIZATION ADMIN: CPT | Performed by: FAMILY MEDICINE

## 2021-09-22 PROCEDURE — 90651 9VHPV VACCINE 2/3 DOSE IM: CPT | Performed by: FAMILY MEDICINE

## 2021-09-22 PROCEDURE — 99396 PREV VISIT EST AGE 40-64: CPT | Performed by: FAMILY MEDICINE

## 2021-09-22 NOTE — PROGRESS NOTES
Subjective   Sue Hall is a 41 y.o. female and is here for a comprehensive physical exam. The patient reports she sees dixon, pap abnormal HPV 16,18, colposcopy ok.  she has had first HPV vaccine. . Patient reports last physical date of   Over 1 year.     Do you take any herbs or supplements that were not prescribed by a doctor? no  Are you taking calcium supplements? no  Are you taking aspirin daily? no  Family history of ovarian cancer? no  Family history of breast cancer? no  FH of endometrial cancer? pgm  FH of cervical cancer? no  FH of colon cancer? no    Cancer Screening  Mammogram up-to-date?  yes  If yes, last exam date: utd  BMD up-to-date? no  If yes, last exam date: na  Colonoscopy up-to-date? no   If yes, last exam date: na  Pap up-to-date? yes   If yes, last exam date: pre gyn hpv 16, 18 positive       History:  LMP: Patient's last menstrual period was 2021 (exact date).  Menopause at na years  Last pap date: 21  Abnormal pap? yes  : 2  Para: 2    Immunization History  Tdap?   HPV? yes  Pneumonia? not applicable  Shingles? not applicable    The following portions of the patient's history were reviewed and updated as appropriate: allergies, current medications, past family history, past medical history, past social history, past surgical history and problem list.    Past Medical History:   Diagnosis Date   • Abnormal Pap smear of cervix     Colpo 2021   • Acne    • Cough    • Headache    • Sinusitis    • Sore throat    • Upper respiratory infection        Family History   Problem Relation Age of Onset   • Uterine cancer Other    • Hypertension Father    • Diabetes Maternal Grandmother    • Hypertension Maternal Grandmother    • Hypertension Maternal Grandfather    • Diabetes Paternal Grandmother    • Hypertension Paternal Grandmother    • Uterine cancer Paternal Grandmother    • Breast cancer Neg Hx    • Ovarian cancer Neg Hx        Past Surgical History:   Procedure  Laterality Date   •  SECTION         Social History     Socioeconomic History   • Marital status:      Spouse name: Not on file   • Number of children: Not on file   • Years of education: Not on file   • Highest education level: Not on file   Tobacco Use   • Smoking status: Never Smoker   • Smokeless tobacco: Never Used   Vaping Use   • Vaping Use: Never used   Substance and Sexual Activity   • Alcohol use: Yes     Comment: rarely   • Drug use: No   • Sexual activity: Yes     Partners: Male     Birth control/protection: Condom       Review of Systems  Do you have pain that bothers you in your daily life? no  Review of Systems   Constitutional: Negative.    HENT: Negative.    Eyes: Negative.    Respiratory: Negative.    Cardiovascular: Negative.    Gastrointestinal: Negative.    Endocrine: Negative.    Genitourinary: Negative.    Musculoskeletal: Negative.    Skin: Negative.    Allergic/Immunologic: Negative.    Neurological: Negative.    Hematological: Negative.    Psychiatric/Behavioral: Negative.    All other systems reviewed and are negative.      Objective   Physical Exam  Vitals and nursing note reviewed.   Constitutional:       Appearance: She is well-developed.   HENT:      Head: Normocephalic and atraumatic.   Eyes:      General:         Right eye: No discharge.         Left eye: No discharge.      Pupils: Pupils are equal, round, and reactive to light.   Cardiovascular:      Rate and Rhythm: Normal rate and regular rhythm.      Heart sounds: Normal heart sounds.   Pulmonary:      Effort: Pulmonary effort is normal.      Breath sounds: Normal breath sounds.   Abdominal:      General: Bowel sounds are normal.      Palpations: Abdomen is soft. There is no mass.      Tenderness: There is no abdominal tenderness.   Musculoskeletal:         General: Normal range of motion.      Right shoulder: No swelling.      Cervical back: Normal range of motion and neck supple.   Skin:     General: Skin is warm  and dry.      Nails: There is no clubbing.   Neurological:      Mental Status: She is alert and oriented to person, place, and time.      Deep Tendon Reflexes: Reflexes are normal and symmetric.   Psychiatric:         Behavior: Behavior normal.         Thought Content: Thought content normal.         Judgment: Judgment normal.          Assessment/Plan   Healthy female exam.      1.   Problem List Items Addressed This Visit        Genitourinary and Reproductive     Macromastia    Relevant Orders    Ambulatory Referral to Plastic Surgery       Health Encounters    Annual physical exam - Primary (Chronic)    Relevant Orders    CBC & Differential    TSH    Comprehensive Metabolic Panel    Hemoglobin A1c    Lipid Panel    Vitamin D 25 Hydroxy    Vitamin B12    POC Urinalysis Dipstick, Automated       Musculoskeletal and Injuries    Chronic midline thoracic back pain    Relevant Orders    Ambulatory Referral to Plastic Surgery      Other Visit Diagnoses     Need for prophylactic vaccination against human papillomavirus (HPV) types 6, 11, 16, and 18        Relevant Medications    HPV 9-Valent Recomb Vaccine suspension 0.5 mL            2. Patient Counseling:  --Nutrition: Stressed importance of moderation in sodium/caffeine intake, saturated fat and cholesterol, caloric balance, sufficient intake of fresh fruits, vegetables, fiber, calcium, iron, and 1 mg of folate supplement per day (for females capable of pregnancy).  --Discussed the issue of estrogen replacement, calcium supplement, and the daily use of baby aspirin.  --Exercise: Stressed the importance of regular exercise.   --Substance Abuse: Discussed cessation/primary prevention of tobacco, alcohol, or other drug use; driving or other dangerous activities under the influence; availability of treatment for abuse.    --Sexuality: Discussed sexually transmitted diseases, partner selection, use of condoms, avoidance of unintended pregnancy  and contraceptive  alternatives.   --Injury prevention: Discussed safety belts, safety helmets, smoke detector, smoking near bedding or upholstery.   --Dental health: Discussed importance of regular tooth brushing, flossing, and dental visits.  --Immunizations reviewed.  --Discussed benefits of screening colonoscopy.  --After hours service discussed with patient    3. Discussed the patient's BMI with her.  The BMI is in the acceptable range  4. Follow up in one year

## 2021-09-28 DIAGNOSIS — Z12.31 BREAST CANCER SCREENING BY MAMMOGRAM: Primary | ICD-10-CM

## 2021-10-19 ENCOUNTER — FLU SHOT (OUTPATIENT)
Dept: FAMILY MEDICINE CLINIC | Facility: CLINIC | Age: 41
End: 2021-10-19

## 2021-10-19 DIAGNOSIS — Z00.00 ANNUAL PHYSICAL EXAM: ICD-10-CM

## 2021-10-19 DIAGNOSIS — Z23 NEED FOR INFLUENZA VACCINATION: Primary | ICD-10-CM

## 2021-10-19 LAB
25(OH)D3 SERPL-MCNC: 29.7 NG/ML
ALBUMIN SERPL-MCNC: 4.6 G/DL (ref 3.5–5.2)
ALBUMIN/GLOB SERPL: 1.8 G/DL
ALP SERPL-CCNC: 61 U/L (ref 39–117)
ALT SERPL W P-5'-P-CCNC: 17 U/L (ref 1–33)
ANION GAP SERPL CALCULATED.3IONS-SCNC: 10.8 MMOL/L (ref 5–15)
AST SERPL-CCNC: 22 U/L (ref 1–32)
BILIRUB SERPL-MCNC: 0.9 MG/DL (ref 0–1.2)
BUN SERPL-MCNC: 8 MG/DL (ref 6–20)
BUN/CREAT SERPL: 9.9 (ref 7–25)
CALCIUM SPEC-SCNC: 9.7 MG/DL (ref 8.6–10.5)
CHLORIDE SERPL-SCNC: 103 MMOL/L (ref 98–107)
CHOLEST SERPL-MCNC: 153 MG/DL (ref 0–200)
CLUMPED PLATELETS: PRESENT
CO2 SERPL-SCNC: 26.2 MMOL/L (ref 22–29)
CREAT SERPL-MCNC: 0.81 MG/DL (ref 0.57–1)
DEPRECATED RDW RBC AUTO: 41.3 FL (ref 37–54)
EOSINOPHIL # BLD MANUAL: 0.19 10*3/MM3 (ref 0–0.4)
EOSINOPHIL NFR BLD MANUAL: 3 % (ref 0.3–6.2)
ERYTHROCYTE [DISTWIDTH] IN BLOOD BY AUTOMATED COUNT: 12.6 % (ref 12.3–15.4)
GFR SERPL CREATININE-BSD FRML MDRD: 77 ML/MIN/1.73
GFR SERPL CREATININE-BSD FRML MDRD: 94 ML/MIN/1.73
GLOBULIN UR ELPH-MCNC: 2.5 GM/DL
GLUCOSE SERPL-MCNC: 75 MG/DL (ref 65–99)
HBA1C MFR BLD: 4.7 % (ref 4.8–5.6)
HCT VFR BLD AUTO: 42.5 % (ref 34–46.6)
HDLC SERPL-MCNC: 65 MG/DL (ref 40–60)
HGB BLD-MCNC: 14.2 G/DL (ref 12–15.9)
LDLC SERPL CALC-MCNC: 77 MG/DL (ref 0–100)
LDLC/HDLC SERPL: 1.19 {RATIO}
LYMPHOCYTES # BLD MANUAL: 1.92 10*3/MM3 (ref 0.7–3.1)
LYMPHOCYTES NFR BLD MANUAL: 29.7 % (ref 19.6–45.3)
LYMPHOCYTES NFR BLD MANUAL: 8.9 % (ref 5–12)
MCH RBC QN AUTO: 30 PG (ref 26.6–33)
MCHC RBC AUTO-ENTMCNC: 33.4 G/DL (ref 31.5–35.7)
MCV RBC AUTO: 89.9 FL (ref 79–97)
MONOCYTES # BLD AUTO: 0.57 10*3/MM3 (ref 0.1–0.9)
NEUTROPHILS # BLD AUTO: 3.77 10*3/MM3 (ref 1.7–7)
NEUTROPHILS NFR BLD MANUAL: 58.4 % (ref 42.7–76)
PLATELET # BLD AUTO: 268 10*3/MM3 (ref 140–450)
PMV BLD AUTO: 11 FL (ref 6–12)
POTASSIUM SERPL-SCNC: 4.6 MMOL/L (ref 3.5–5.2)
PROT SERPL-MCNC: 7.1 G/DL (ref 6–8.5)
RBC # BLD AUTO: 4.73 10*6/MM3 (ref 3.77–5.28)
RBC MORPH BLD: NORMAL
SODIUM SERPL-SCNC: 140 MMOL/L (ref 136–145)
TRIGL SERPL-MCNC: 54 MG/DL (ref 0–150)
TSH SERPL DL<=0.05 MIU/L-ACNC: 3.24 UIU/ML (ref 0.27–4.2)
VIT B12 BLD-MCNC: 609 PG/ML (ref 211–946)
VLDLC SERPL-MCNC: 11 MG/DL (ref 5–40)
WBC # BLD AUTO: 6.45 10*3/MM3 (ref 3.4–10.8)
WBC MORPH BLD: NORMAL

## 2021-10-19 PROCEDURE — 80053 COMPREHEN METABOLIC PANEL: CPT | Performed by: FAMILY MEDICINE

## 2021-10-19 PROCEDURE — 90471 IMMUNIZATION ADMIN: CPT | Performed by: FAMILY MEDICINE

## 2021-10-19 PROCEDURE — 80061 LIPID PANEL: CPT | Performed by: FAMILY MEDICINE

## 2021-10-19 PROCEDURE — 83036 HEMOGLOBIN GLYCOSYLATED A1C: CPT | Performed by: FAMILY MEDICINE

## 2021-10-19 PROCEDURE — 84443 ASSAY THYROID STIM HORMONE: CPT | Performed by: FAMILY MEDICINE

## 2021-10-19 PROCEDURE — 85007 BL SMEAR W/DIFF WBC COUNT: CPT | Performed by: FAMILY MEDICINE

## 2021-10-19 PROCEDURE — 90686 IIV4 VACC NO PRSV 0.5 ML IM: CPT | Performed by: FAMILY MEDICINE

## 2021-10-19 PROCEDURE — 85025 COMPLETE CBC W/AUTO DIFF WBC: CPT | Performed by: FAMILY MEDICINE

## 2021-10-19 PROCEDURE — 82306 VITAMIN D 25 HYDROXY: CPT | Performed by: FAMILY MEDICINE

## 2021-10-19 PROCEDURE — 36415 COLL VENOUS BLD VENIPUNCTURE: CPT | Performed by: FAMILY MEDICINE

## 2021-10-19 PROCEDURE — 82607 VITAMIN B-12: CPT | Performed by: FAMILY MEDICINE

## 2021-10-20 ENCOUNTER — TELEPHONE (OUTPATIENT)
Dept: FAMILY MEDICINE CLINIC | Facility: CLINIC | Age: 41
End: 2021-10-20

## 2021-10-20 NOTE — TELEPHONE ENCOUNTER
Caller: Sue Hall    Relationship: Self    Best call back number: [phone]    Caller requesting test results:     What test was performed: routine bloodwork    When was the test performed: 10/19/21    Where was the test performed: in office    Additional notes: patient has some questions about her results         none

## 2021-12-09 ENCOUNTER — HOSPITAL ENCOUNTER (OUTPATIENT)
Dept: MAMMOGRAPHY | Facility: HOSPITAL | Age: 41
Discharge: HOME | End: 2021-12-09
Admitting: OBSTETRICS & GYNECOLOGY

## 2021-12-09 DIAGNOSIS — Z12.31 BREAST CANCER SCREENING BY MAMMOGRAM: ICD-10-CM

## 2021-12-09 PROCEDURE — 77067 SCR MAMMO BI INCL CAD: CPT | Performed by: RADIOLOGY

## 2021-12-09 PROCEDURE — 77067 SCR MAMMO BI INCL CAD: CPT

## 2021-12-09 PROCEDURE — 77063 BREAST TOMOSYNTHESIS BI: CPT

## 2021-12-09 PROCEDURE — 77063 BREAST TOMOSYNTHESIS BI: CPT | Performed by: RADIOLOGY

## 2022-01-24 ENCOUNTER — OFFICE VISIT (OUTPATIENT)
Dept: FAMILY MEDICINE CLINIC | Facility: CLINIC | Age: 42
End: 2022-01-24

## 2022-01-24 VITALS
SYSTOLIC BLOOD PRESSURE: 130 MMHG | BODY MASS INDEX: 26.16 KG/M2 | RESPIRATION RATE: 17 BRPM | WEIGHT: 157 LBS | DIASTOLIC BLOOD PRESSURE: 90 MMHG | OXYGEN SATURATION: 100 % | HEIGHT: 65 IN | HEART RATE: 72 BPM | TEMPERATURE: 97.4 F

## 2022-01-24 DIAGNOSIS — E16.2 LOW BLOOD GLUCOSE MEASUREMENT: Primary | ICD-10-CM

## 2022-01-24 DIAGNOSIS — Z23 IMMUNIZATION DUE: ICD-10-CM

## 2022-01-24 PROCEDURE — 99214 OFFICE O/P EST MOD 30 MIN: CPT | Performed by: NURSE PRACTITIONER

## 2022-01-24 PROCEDURE — 90649 4VHPV VACCINE 3 DOSE IM: CPT | Performed by: NURSE PRACTITIONER

## 2022-01-24 PROCEDURE — 90471 IMMUNIZATION ADMIN: CPT | Performed by: NURSE PRACTITIONER

## 2022-02-07 NOTE — PATIENT INSTRUCTIONS
Hypoglycemia  Hypoglycemia is when the sugar (glucose) level in your blood is too low. Low blood sugar can happen to people who have diabetes and people who do not have diabetes. Low blood sugar can happen quickly, and it can be an emergency.  What are the causes?  This condition happens most often in people who have diabetes and may be caused by:  · Diabetes medicine.  · Not eating enough, or not eating often enough.  · Doing more physical activity.  · Drinking alcohol on an empty stomach.  If you do not have diabetes, hypoglycemia may be caused by:  · A tumor in the pancreas.  · Not eating enough, or not eating for long periods at a time (fasting).  · A very bad infection or illness.  · Problems after having weight loss (bariatric) surgery.  · Kidney failure or liver failure.  · Certain medicines.  What increases the risk?  This condition is more likely to develop in people who:  · Have diabetes and take medicines to lower their blood sugar.  · Abuse alcohol.  · Have a very bad illness.  What are the signs or symptoms?  Symptoms depend on whether your low blood sugar is mild, moderate, or very low.  Mild  · Hunger.  · Feeling worried or nervous (anxious).  · Sweating and feeling clammy.  · Feeling dizzy or light-headed.  · Being sleepy or having trouble sleeping.  · Feeling like you may vomit (nauseous).  · A fast heartbeat.  · A headache.  · Blurry vision.  · Being irritable or grouchy.  · Tingling or loss of feeling (numbness) around your mouth, lips, or tongue.  · Trouble with moving (coordination).  Moderate  · Confusion and poor judgment.  · Behavior changes.  · Weakness.  · Uneven heartbeats.  Very low  Very low blood sugar (severe hypoglycemia) is a medical emergency. It can cause:  · Fainting.  · Jerky movements that you cannot control (seizure).  · Loss of consciousness (coma).  · Death.  How is this treated?  Treating low blood sugar  Low blood sugar is often treated by eating or drinking something  sugary right away. The snack should contain 15 grams of a fast-acting carb (carbohydrate). Options include:  · 4 oz (120 mL) of fruit juice.  · 4-6 oz (120-150 mL) of regular soda (not diet soda).  · 8 oz (240 mL) of low-fat milk.  · Several pieces of hard candy. Check food labels to find out how many to eat for 15 grams.  · 1 Tbsp (15 mL) of sugar or honey.  Treating low blood sugar if you have diabetes  If you can think clearly and swallow safely, follow the 15:15 rule:  · Take 15 grams of a fast-acting carb. Talk with your doctor about how much you should take.  · Always keep a source of fast-acting carb with you, such as:  ? Sugar tablets (glucose pills). Take 4 pills.  ? Several pieces of hard candy. Check food labels to see how many pieces to eat for 15 grams.  ? 4 oz (120 mL) of fruit juice.  ? 4-6 oz (120-150 mL) of regular (not diet) soda.  ? 1 Tbsp (15 mL) of honey or sugar.  · Check your blood sugar 15 minutes after you take the carb.  · If your blood sugar is still at or below 70 mg/dL (3.9 mmol/L), take 15 grams of a carb again.  · If your blood sugar does not go above 70 mg/dL (3.9 mmol/L) after 3 tries, get help right away.  · After your blood sugar goes back to normal, eat a meal or a snack within 1 hour.    Treating very low blood sugar  If your blood sugar is at or below 54 mg/dL (3 mmol/L), you have very low blood sugar, or severe hypoglycemia. This is an emergency. Get medical help right away.  If you have very low blood sugar and you cannot eat or drink, you will need to be given a hormone called glucagon. A family member or friend should learn how to check your blood sugar and how to give you glucagon. Ask your doctor if you need to have an emergency glucagon kit at home.  Very low blood sugar may also need to be treated in a hospital.  Follow these instructions at home:  General instructions  · Take over-the-counter and prescription medicines only as told by your doctor.  · Stay aware of your  blood sugar as told by your doctor.  · If you drink alcohol:  ? Limit how much you use to:  § 0-1 drink a day for nonpregnant women.  § 0-2 drinks a day for men.  ? Be aware of how much alcohol is in your drink. In the [place] one drink equals one 12 oz bottle of beer (355 mL), one 5 oz glass of wine (148 mL), or one 1½ oz glass of hard liquor (44 mL).  · Keep all follow-up visits as told by your doctor. This is important.  If you have diabetes:    · Always have a rapid-acting carb (15 grams) option with you to treat low blood sugar.  · Follow your diabetes care plan as told by your doctor. Make sure you:  ? Know the symptoms of low blood sugar.  ? Check your blood sugar as often as told by your doctor. Always check it before and after exercise.  ? Always check your blood sugar before you drive.  ? Take your medicines as told.  ? Follow your meal plan.  ? Eat on time. Do not skip meals.  · Share your diabetes care plan with:  ? Your work or school.  ? People you live with.  · Carry a card or wear jewelry that says you have diabetes.    Contact a doctor if:  · You have trouble keeping your blood sugar in your target range.  · You have low blood sugar often.  Get help right away if:  · You still have symptoms after you eat or drink something that contains 15 grams of fast-acting carb and you cannot get your blood sugar above 70 mg/dL by following the 15:15 rule.  · Your blood sugar is at or below 54 mg/dL (3 mmol/L).  · You have a seizure.  · You faint.  These symptoms may be an emergency. Do not wait to see if the symptoms will go away. Get medical help right away. Call your local emergency services (911 in the [place] Do not drive yourself to the hospital.  Summary  · Hypoglycemia happens when the level of sugar (glucose) in your blood is too low.  · Low blood sugar can happen to people who have diabetes and people who do not have diabetes. Low blood sugar can happen quickly, and it can be an emergency.  · Make sure  you know the symptoms of low blood sugar and know how to treat it.  · Always keep a source of sugar (fast-acting carb) with you to treat low blood sugar.  This information is not intended to replace advice given to you by your health care provider. Make sure you discuss any questions you have with your health care provider.  Document Revised: 11/11/2020 Document Reviewed: 11/11/2020  [employer] Patient Education © 2021 [employer]  HPV (Human Papillomavirus) Vaccine: What You Need to Know  1. Why get vaccinated?  HPV (Human papillomavirus) vaccine can prevent infection with some types of human papillomavirus.  HPV infections can cause certain types of cancers including:  · cervical, vaginal and vulvar cancers in women,  · penile cancer in men, and  · anal cancers in both men and women.  HPV vaccine prevents infection from the HPV types that cause over 90% of these cancers.  HPV is spread through intimate skin-to-skin or sexual contact. HPV infections are so common that nearly all men and women will get at least one type of HPV at some time in their lives.  Most HPV infections go away by themselves within 2 years. But sometimes HPV infections will last longer and can cause cancers later in life.  2. HPV vaccine  HPV vaccine is routinely recommended for adolescents at 11 or 12 years of age to ensure they are protected before they are exposed to the virus. HPV vaccine may be given beginning at age 9 years, and as late as age 45 years.  Most people older than 26 years will not benefit from HPV vaccination. Talk with your health care provider if you want more information.  Most children who get the first dose before 15 years of age need 2 doses of HPV vaccine. Anyone who gets the first dose on or after 15 years of age, and younger people with certain immunocompromising conditions, need 3 doses. Your health care provider can give you more information.  HPV vaccine may be given at the same time as other vaccines.  3. Talk  with your health care provider  Tell your vaccine provider if the person getting the vaccine:  · Has had an allergic reaction after a previous dose of HPV vaccine, or has any severe, life-threatening allergies.  · Is pregnant.  In some cases, your health care provider may decide to postpone HPV vaccination to a future visit.  People with minor illnesses, such as a cold, may be vaccinated. People who are moderately or severely ill should usually wait until they recover before getting HPV vaccine.  Your health care provider can give you more information.  4. Risks of a vaccine reaction  · Soreness, redness, or swelling where the shot is given can happen after HPV vaccine.  · Fever or headache can happen after HPV vaccine.  People sometimes faint after medical procedures, including vaccination. Tell your provider if you feel dizzy or have vision changes or ringing in the ears.  As with any medicine, there is a very remote chance of a vaccine causing a severe allergic reaction, other serious injury, or death.  5. What if there is a serious problem?  An allergic reaction could occur after the vaccinated person leaves the clinic. If you see signs of a severe allergic reaction (hives, swelling of the face and throat, difficulty breathing, a fast heartbeat, dizziness, or weakness), call 9-1-1 and get the person to the nearest hospital.  For other signs that concern you, call your health care provider.  Adverse reactions should be reported to the Vaccine Adverse Event Reporting System (VAERS). Your health care provider will usually file this report, or you can do it yourself. Visit the VAERS website at www.vaers.hhs.gov or call 1-699.807.2149. VAERS is only for reporting reactions, and VAERS staff do not give medical advice.  6. The National Vaccine Injury Compensation Program  The National Vaccine Injury Compensation Program (VICP) is a federal program that was created to compensate people who may have been injured by  certain vaccines. Visit the VICP website at www.[facility].gov/vaccinecompensation or call 1-217.852.8580 to learn about the program and about filing a claim. There is a time limit to file a claim for compensation.  7. How can I learn more?  · Ask your health care provider.  · Call your local or state health department.  · Contact the Centers for Disease Control and Prevention (CDC):  ? Call 1-256.331.8116 (5-971-IUB-INFO) or  ? Visit CDC's website at www.cdc.gov/vaccines  Vaccine Information Statement (Interim) HPV Vaccine (10/30/2019)  This information is not intended to replace advice given to you by your health care provider. Make sure you discuss any questions you have with your health care provider.  Document Revised: 12/09/2020 Document Reviewed: 12/09/2020  [employer] Patient Education © 2021 [employer]

## 2022-03-02 ENCOUNTER — HOSPITAL ENCOUNTER (OUTPATIENT)
Dept: NUTRITION | Facility: HOSPITAL | Age: 42
Setting detail: RECURRING SERIES
Discharge: HOME | End: 2022-03-02

## 2022-03-02 VITALS — BODY MASS INDEX: 26.66 KG/M2 | HEIGHT: 65 IN | WEIGHT: 160 LBS

## 2022-03-02 PROCEDURE — 97802 MEDICAL NUTRITION INDIV IN: CPT

## 2022-03-02 NOTE — CONSULTS
"Adult Outpatient Nutrition  Assessment    Patient Name:  Sue Hall  YOB: 1980  MRN: [id number]    Assessment Date:  3/2/2022    Comments:      This medical referred consult was provided via zoom as patient was unable to attend an in-office appointment today due to the COVID-19 crisis. Consent for treatment was given verbally. RD spent a total of 60 minutes with patient today.      Reason for visit:     Patient is a 41 year old female who is referred today for a low blood glucose measurement. Patient shared she has been experiencing symptoms of low blood sugar in the past 6 months. She reported feeling sweaty and shaking, especially when she has gone several hours without eating. Patient explained she works from home and often finds herself in back to back calls that make it difficult to eat. About one month ago, patient visited with GORDY Coronado who expressed potential concern for low blood glucose. Since meeting with Ms. Van, patient reported she has started eating more frequently and sipping on sweetened beverages. Patient reported a significant improvement in her energy and a decrease in her symptoms of hypoglycemia. Patient complains of weight gain of ~ 5 lbs in the past month since increasing her frequency of eating. Patient would like to discuss how to manage her symptoms of hypoglycemia while also promoting a healthy weight.    Session Details:     Attendees: patient  Language/communication details: English is preferred language for teaching  Barriers to learning: none  Health literacy: adequate  Details of home: patient lives at home with her  and two teenage children.    Anthropometrics:     Ht Readings from Last 1 Encounters:   03/02/22 165.1 cm (65\")      Wt Readings from Last 1 Encounters:   03/02/22 72.6 kg (160 lb)      BMI Readings from Last 1 Encounters:   03/02/22 26.63 kg/m²      Ideal body weight: 57 kg (125 lb 10.6 oz)  Adjusted ideal body weight: 63.2 kg (139 lb " 6.4 oz)       Pertinent Labs:     Lab Results   Component Value Date    HGBA1C 4.70 (L) 10/19/2021       Pertinent Medications/Supplements:    Current Outpatient Medications   Medication Instructions   • Multiple Vitamins-Minerals (MULTIVITAL PO) Oral       Nutrition Assessment:     Food assistance programs tiarra  Who prepares most meals: patient or spouse  Who does grocery shopping: patient or spouse   Frequency of eating out: 3-5 times per week  Food allergies/intolerances/aversions: none  Difficulty chewing: none  Difficulty swallowing: none  Gastrointestinal symptoms that impact intake or food choices: none  Diet requirements related to personal preference or cultural belief: none  Support for health: PCP    Diet recall:   Time Food/beverages consumed   7:30 am 8 fl oz black tea, biscotti, handful of almonds   8:30 am 8 fl oz black tea   10:30 am 4 oz milk, handful cereal, handful blueberries   1:00 pm 1 1/2 cups left over lentil naranjo soup, 1 cup rice   3:00 pm 6 fl oz sparkling probiotic drink (~10 grams sugar), handful peanuts   5:00 pm 1 slice cheese pizza   10:00 pm 1 1/2 cups chicken naranjo, 1 cup rice     Nutrition assessment comments: Patient stated the dietary recall above is representative of a typical day of eating for her.    Physical activity:    Mix of cardio and strength training at the gym for 60-90 minutes, 3 days per week.    Assessed Needs:     Estimated energy needs: 7677-1803 calories per day ([place] [address] Jeor, activity factor 1.2, subtracting 250-500 calories to promote safe weight loss)    Estimated protein needs: 72 - 86 grams per day (1.0-1.2 grams kg of actual body weight)    PES Statement:     Altered nutrition related lab related to diet and lifestyle a as evidenced by most recent Hgb A1c of 4.7% and patient reporting sympotms of sweating and shaking during the day.      Discussion:    RD discussed the importance of eating consistent, balanced meals to promote a stable blood glucose. Per  dietary recall, patient's diet is high in carbohydrates and low in protein. We reviewed the different food groups and identified what foods contain protein, carbohydrates, and fat. RD used the plate method to demonstrate the components of a balanced meal. RD provided patient with several meal and snack ideas. We discussed management of hypoglycemia, such as taking a glucose tablet, drinking 4 oz juice, or eating a pack of fruit snacks and then waiting about 15 minutes to either eat another quick source of sugar if symptoms have not resolved or a source of protein/fat like nuts if the symptoms have resolved to promote a stable blood glucose. RD also recommended patient avoid caffeine as it may further promote low blood glucose. RD suggested patient could drink an herbal tea compared to a black tea. Patient stated she found all the recommendations very helpful. She was able to teach back concepts discussed by suggesting she could have greek yogurt for breakfast, have a snack of an apple with peanut butter instead of cereal, and add adequate amounts of protein to all her meals and snacks. RD encouraged patient to contact RD with any needs prior to next scheduled visit.     Goals:    1) Eat 3 balanced meals and 3 snacks daily.  3) Have 20-25 grams protein with each meal.  3) Have 10 grams protein with each snack.     Resources Provided:     RD received permission to e-mail patient [facility] balanced meal and snack ideas.       Total of 60 minutes spent with patient on nutrition counseling. Education based on Academy of Nutrition and Dietetics guidelines. Patient was provided with RD's contact information. Follow up visit is scheduled for  5/12/22 at 1:00 pm. Thank you for this referral.      Electronically signed by:  Shakila Pritchard RD  03/02/22 15:53 EST

## 2022-03-03 NOTE — ADDENDUM NOTE
Encounter addended by: Shakila Pritchard RD on: 3/3/2022 11:39 AM   Actions taken: Clinical Note Signed

## 2022-03-14 ENCOUNTER — TELEPHONE (OUTPATIENT)
Dept: FAMILY MEDICINE CLINIC | Facility: CLINIC | Age: 42
End: 2022-03-14

## 2022-03-14 NOTE — TELEPHONE ENCOUNTER
PATIENT STATES THERE IS A LITTLE LUMP UNDER LEFT UNDERARM.  WHAT TO DO?    PLEASE CALL [phone]

## 2022-04-27 ENCOUNTER — OFFICE VISIT (OUTPATIENT)
Dept: INTERNAL MEDICINE | Facility: CLINIC | Age: 42
End: 2022-04-27

## 2022-04-27 ENCOUNTER — LAB (OUTPATIENT)
Dept: LAB | Facility: HOSPITAL | Age: 42
End: 2022-04-27

## 2022-04-27 VITALS
BODY MASS INDEX: 26.66 KG/M2 | HEART RATE: 79 BPM | SYSTOLIC BLOOD PRESSURE: 138 MMHG | WEIGHT: 160.2 LBS | OXYGEN SATURATION: 99 % | TEMPERATURE: 98.1 F | DIASTOLIC BLOOD PRESSURE: 96 MMHG

## 2022-04-27 DIAGNOSIS — R51.9 NEW ONSET HEADACHE: ICD-10-CM

## 2022-04-27 DIAGNOSIS — E16.2 HYPOGLYCEMIA: ICD-10-CM

## 2022-04-27 DIAGNOSIS — R03.0 ELEVATED BLOOD PRESSURE READING IN OFFICE WITHOUT DIAGNOSIS OF HYPERTENSION: ICD-10-CM

## 2022-04-27 DIAGNOSIS — N63.31 MASS OF AXILLARY TAIL OF RIGHT BREAST: Primary | ICD-10-CM

## 2022-04-27 DIAGNOSIS — F43.22 ADJUSTMENT DISORDER WITH ANXIOUS MOOD: ICD-10-CM

## 2022-04-27 PROBLEM — J40 BRONCHITIS: Status: RESOLVED | Noted: 2017-11-27 | Resolved: 2022-04-27

## 2022-04-27 PROBLEM — M76.32 ILIOTIBIAL BAND SYNDROME OF BOTH SIDES: Status: RESOLVED | Noted: 2017-03-15 | Resolved: 2022-04-27

## 2022-04-27 PROBLEM — G89.29 CHRONIC MIDLINE THORACIC BACK PAIN: Status: RESOLVED | Noted: 2019-04-09 | Resolved: 2022-04-27

## 2022-04-27 PROBLEM — M25.562 LEFT KNEE PAIN: Status: RESOLVED | Noted: 2018-03-27 | Resolved: 2022-04-27

## 2022-04-27 PROBLEM — R31.9 HEMATURIA: Status: RESOLVED | Noted: 2017-03-15 | Resolved: 2022-04-27

## 2022-04-27 PROBLEM — R05.9 COUGH: Status: RESOLVED | Noted: 2020-02-10 | Resolved: 2022-04-27

## 2022-04-27 PROBLEM — N62 MACROMASTIA: Status: RESOLVED | Noted: 2019-04-09 | Resolved: 2022-04-27

## 2022-04-27 PROBLEM — R87.610 ATYPICAL SQUAMOUS CELLS OF UNDETERMINED SIGNIFICANCE (ASCUS) ON PAPANICOLAOU SMEAR OF CERVIX: Status: ACTIVE | Noted: 2022-04-27

## 2022-04-27 PROBLEM — M54.6 CHRONIC MIDLINE THORACIC BACK PAIN: Status: RESOLVED | Noted: 2019-04-09 | Resolved: 2022-04-27

## 2022-04-27 PROBLEM — M76.31 ILIOTIBIAL BAND SYNDROME OF BOTH SIDES: Status: RESOLVED | Noted: 2017-03-15 | Resolved: 2022-04-27

## 2022-04-27 LAB
ANION GAP SERPL CALCULATED.3IONS-SCNC: 11.1 MMOL/L (ref 5–15)
BUN SERPL-MCNC: 11 MG/DL (ref 6–20)
BUN/CREAT SERPL: 15.9 (ref 7–25)
CALCIUM SPEC-SCNC: 9.9 MG/DL (ref 8.6–10.5)
CHLORIDE SERPL-SCNC: 103 MMOL/L (ref 98–107)
CO2 SERPL-SCNC: 24.9 MMOL/L (ref 22–29)
CORTIS SERPL-MCNC: 9.57 MCG/DL
CREAT SERPL-MCNC: 0.69 MG/DL (ref 0.57–1)
EGFRCR SERPLBLD CKD-EPI 2021: 110.6 ML/MIN/1.73
GLUCOSE SERPL-MCNC: 97 MG/DL (ref 65–99)
POTASSIUM SERPL-SCNC: 3.8 MMOL/L (ref 3.5–5.2)
SODIUM SERPL-SCNC: 139 MMOL/L (ref 136–145)
TSH SERPL DL<=0.05 MIU/L-ACNC: 2.87 UIU/ML (ref 0.27–4.2)

## 2022-04-27 PROCEDURE — 84443 ASSAY THYROID STIM HORMONE: CPT

## 2022-04-27 PROCEDURE — 80048 BASIC METABOLIC PNL TOTAL CA: CPT

## 2022-04-27 PROCEDURE — 82533 TOTAL CORTISOL: CPT

## 2022-04-27 PROCEDURE — 99214 OFFICE O/P EST MOD 30 MIN: CPT | Performed by: INTERNAL MEDICINE

## 2022-04-27 RX ORDER — BUSPIRONE HYDROCHLORIDE 5 MG/1
5 TABLET ORAL 2 TIMES DAILY
Qty: 60 TABLET | Refills: 0 | Status: SHIPPED | OUTPATIENT
Start: 2022-04-27 | End: 2022-05-11 | Stop reason: SDUPTHER

## 2022-04-27 NOTE — PROGRESS NOTES
Internal Medicine New Patient  Sue Hall is a 41 y.o. female who presents today to establish care and with concerns as outlined below.    Chief Complaint  Chief Complaint   Patient presents with   • Establish Care   • Stress     Having headaches, no meds taken, worse with stress.   • Excessive Sweating     Only occasionally, advised maybe panic attacks or blood sugar?   • Mass     Under left arm, not painful, about 2 months, noticeable, no drainage.        HPI  Ms. Hall comes in today to establish care. She was last a patient of [facility] Mckeon. She still sees Dr. Aguilar for GYN care, has had ASCUS with positive non 16/18 HPV but negative colposcopy. She notes 3-4 months, potentially longer, of developing sweaty palms, shakiness, feeling that something is not right while driving. She notes that this is typically while driving her kids to school in the morning. She thought it could be panic attacks but a provider at her last PCP office thought she could have low blood sugar because she had not eaten in the morning. She was sent to dietician and Dr. Bautista who she will see in June. She has seen the nutritionist and made dietary changes however she still is having the same issues while driving. She does get quick relief by eating a candy. She does note high stress job, busy with kids, and recent move both which could be contributing. She additionally notes 1 month of intermittent occipital headaches. She has not had any focal weakness or vision changes. No N/V. She is concerned with elevated BP reading in office today. She does not currently have access to a BP cuff at home.       Review of Systems  Review of Systems   Constitutional: Positive for diaphoresis. Negative for fever, unexpected weight gain and unexpected weight loss.   Eyes: Negative for blurred vision, double vision and visual disturbance.   Respiratory: Negative.    Cardiovascular: Negative.    Gastrointestinal: Negative.    Genitourinary: Negative.   Positive for breast lump. Negative for breast discharge and breast pain.   Musculoskeletal: Negative for neck pain and neck stiffness.   Skin: Negative.    Neurological: Positive for tremors and headache. Negative for weakness and numbness.   Psychiatric/Behavioral: Positive for stress. Negative for depressed mood. The patient is nervous/anxious.         Past Medical History  Past Medical History:   Diagnosis Date   • Abnormal Pap smear of cervix     Colpo 2021   • Acne    • Cough    • Headache    • Sinusitis    • Sore throat    • Upper respiratory infection         Surgical History  Past Surgical History:   Procedure Laterality Date   •  SECTION          Family History  Family History   Problem Relation Age of Onset   • Uterine cancer Other    • Hypertension Father    • Diabetes Maternal Grandmother    • Hypertension Maternal Grandmother    • Hypertension Maternal Grandfather    • Diabetes Paternal Grandmother    • Hypertension Paternal Grandmother    • Uterine cancer Paternal Grandmother    • Breast cancer Neg Hx    • Ovarian cancer Neg Hx         Social History  Social History     Socioeconomic History   • Marital status:    Tobacco Use   • Smoking status: Never Smoker   • Smokeless tobacco: Never Used   Vaping Use   • Vaping Use: Never used   Substance and Sexual Activity   • Alcohol use: Yes     Comment: rarely   • Drug use: No   • Sexual activity: Yes     Partners: Male     Birth control/protection: Condom        Current Medications  Current Outpatient Medications on File Prior to Visit   Medication Sig Dispense Refill   • Multiple Vitamins-Minerals (MULTIVITAL PO) Take  by mouth.       Current Facility-Administered Medications on File Prior to Visit   Medication Dose Route Frequency Provider Last Rate Last Admin   • [DISCONTINUED] HPV 9-Valent Recomb Vaccine suspension 0.5 mL  0.5 mL Intramuscular Take As Directed Kate Aguilar MD   0.5 mL at 21 1039       Allergies  No Known  Allergies     Objective  Visit Vitals  /96   Pulse 79   Temp 98.1 °F (36.7 °C)   Wt 72.7 kg (160 lb 3.2 oz)   SpO2 99%   BMI 26.66 kg/m²        Physical Exam  Physical Exam  Vitals and nursing note reviewed.   Constitutional:       General: She is not in acute distress.     Appearance: Normal appearance. She is well-developed and normal weight. She is not ill-appearing, toxic-appearing or diaphoretic.   HENT:      Head: Normocephalic and atraumatic.      Right Ear: External ear normal.      Left Ear: External ear normal.      Nose: Nose normal.   Eyes:      General: No scleral icterus.     Extraocular Movements: Extraocular movements intact.      Conjunctiva/sclera: Conjunctivae normal.      Pupils: Pupils are equal, round, and reactive to light.   Neck:      Thyroid: No thyromegaly.   Cardiovascular:      Rate and Rhythm: Normal rate and regular rhythm.      Heart sounds: Normal heart sounds. No murmur heard.  Pulmonary:      Effort: Pulmonary effort is normal. No respiratory distress.      Breath sounds: Normal breath sounds.   Chest:       Musculoskeletal:         General: No deformity.      Cervical back: Neck supple.      Right lower leg: No edema.      Left lower leg: No edema.   Lymphadenopathy:      Cervical: No cervical adenopathy.   Skin:     General: Skin is warm and dry.      Findings: No rash.   Neurological:      General: No focal deficit present.      Mental Status: She is alert and oriented to person, place, and time.      Gait: Gait normal.   Psychiatric:         Mood and Affect: Affect normal. Mood is anxious.         Speech: Speech normal.         Behavior: Behavior normal.         Thought Content: Thought content normal.         Judgment: Judgment normal.          Results  Results for orders placed or performed in visit on 10/19/21   Vitamin B12    Specimen: Arm, Left; Blood   Result Value Ref Range    Vitamin B-12 609 211 - 946 pg/mL   Vitamin D 25 Hydroxy    Specimen: Arm, Left; Blood    Result Value Ref Range    25 Hydroxy, Vitamin D 29.7 ng/ml   Lipid Panel    Specimen: Blood   Result Value Ref Range    Total Cholesterol 153 0 - 200 mg/dL    Triglycerides 54 0 - 150 mg/dL    HDL Cholesterol 65 (H) 40 - 60 mg/dL    LDL Cholesterol  77 0 - 100 mg/dL    VLDL Cholesterol 11 5 - 40 mg/dL    LDL/HDL Ratio 1.19    Hemoglobin A1c    Specimen: Blood   Result Value Ref Range    Hemoglobin A1C 4.70 (L) 4.80 - 5.60 %   Comprehensive Metabolic Panel    Specimen: Blood   Result Value Ref Range    Glucose 75 65 - 99 mg/dL    BUN 8 6 - 20 mg/dL    Creatinine 0.81 0.57 - 1.00 mg/dL    Sodium 140 136 - 145 mmol/L    Potassium 4.6 3.5 - 5.2 mmol/L    Chloride 103 98 - 107 mmol/L    CO2 26.2 22.0 - 29.0 mmol/L    Calcium 9.7 8.6 - 10.5 mg/dL    Total Protein 7.1 6.0 - 8.5 g/dL    Albumin 4.60 3.50 - 5.20 g/dL    ALT (SGPT) 17 1 - 33 U/L    AST (SGOT) 22 1 - 32 U/L    Alkaline Phosphatase 61 39 - 117 U/L    Total Bilirubin 0.9 0.0 - 1.2 mg/dL    eGFR Non African Amer 77 >60 mL/min/1.73    eGFR  African Amer 94 >60 mL/min/1.73    Globulin 2.5 gm/dL    A/G Ratio 1.8 g/dL    BUN/Creatinine Ratio 9.9 7.0 - 25.0    Anion Gap 10.8 5.0 - 15.0 mmol/L   TSH    Specimen: Blood   Result Value Ref Range    TSH 3.240 0.270 - 4.200 uIU/mL   CBC Auto Differential    Specimen: Blood   Result Value Ref Range    WBC 6.45 3.40 - 10.80 10*3/mm3    RBC 4.73 3.77 - 5.28 10*6/mm3    Hemoglobin 14.2 12.0 - 15.9 g/dL    Hematocrit 42.5 34.0 - 46.6 %    MCV 89.9 79.0 - 97.0 fL    MCH 30.0 26.6 - 33.0 pg    MCHC 33.4 31.5 - 35.7 g/dL    RDW 12.6 12.3 - 15.4 %    RDW-SD 41.3 37.0 - 54.0 fl    MPV 11.0 6.0 - 12.0 fL    Platelets 268 140 - 450 10*3/mm3   Manual Differential    Specimen: Blood   Result Value Ref Range    Neutrophil % 58.4 42.7 - 76.0 %    Lymphocyte % 29.7 19.6 - 45.3 %    Monocyte % 8.9 5.0 - 12.0 %    Eosinophil % 3.0 0.3 - 6.2 %    Neutrophils Absolute 3.77 1.70 - 7.00 10*3/mm3    Lymphocytes Absolute 1.92 0.70 - 3.10 10*3/mm3     Monocytes Absolute 0.57 0.10 - 0.90 10*3/mm3    Eosinophils Absolute 0.19 0.00 - 0.40 10*3/mm3    RBC Morphology Normal Normal    WBC Morphology Normal Normal    Clumped Platelets Present None Seen        Assessment and Plan  Diagnoses and all orders for this visit:    Mass of axillary tail of right breast  - Tenderness and palpable lump in axillary tail of R breast. Will obtain diagnostic mammogram and breast US.    New onset headache  - 1 month of intermittent occipital headaches without neurologic deficit. Could be related to increased stress and tension. Will give 2 weeks time for buspar to help with her stress and anxiety. If not improving consider head imaging to rule out mass or other intracranial process.    Adjustment disorder with anxious mood  - Having episodes of tremors, diaphoresis, and worry which may be due to high stress job. Will start buspar 5mg BID and follow up in 2 weeks.    Hypoglycemia  - Prior provider felt that her episodes of tremor and diaphoresis could be due to hypoglycemia but this has not been proven. No improvement with change in diet but does resolve when she eats candy. A1c in 10/2021 was low, TSH normal.  - Will get glucose today with BMP, TSH, and cortisol level. If cortisol is low will need to repeat at 8am.  - I suspect her symptoms may be related to anxiety or another etiology    Elevated blood pressure reading in office without diagnosis of hypertension  - BP today slightly elevated. She will get BP cuff and start taking BP daily. She will bring log to her next visit.       Health Maintenance   Topic Date Due   • INFLUENZA VACCINE  08/01/2022   • ANNUAL PHYSICAL  09/23/2022   • PAP SMEAR  07/22/2024   • TDAP/TD VACCINES (3 - Td or Tdap) 09/16/2030   • HEPATITIS C SCREENING  Completed   • COVID-19 Vaccine  Completed   • Pneumococcal Vaccine 0-64  Aged Out     Health Maintenance  - Pap smear: 7/2021 ASCUS with positive HPV non-16/18. Negative colpo. GYN Aguilar.   - Mammogram:  Start screening at age 40.  - Colonoscopy: Start screening at age 45.  - HCV: negative  - Immunizations: COVID UTD. Tdap 9/2020.  - Depression screening: negative 4/2022    Return in about 2 weeks (around 5/11/2022) for Follow up headaches, stress, breast/axillary lump 30 minutes, Labs today.

## 2022-05-11 ENCOUNTER — OFFICE VISIT (OUTPATIENT)
Dept: INTERNAL MEDICINE | Facility: CLINIC | Age: 42
End: 2022-05-11

## 2022-05-11 VITALS
OXYGEN SATURATION: 100 % | WEIGHT: 161 LBS | DIASTOLIC BLOOD PRESSURE: 76 MMHG | TEMPERATURE: 97 F | BODY MASS INDEX: 26.79 KG/M2 | RESPIRATION RATE: 18 BRPM | HEART RATE: 72 BPM | SYSTOLIC BLOOD PRESSURE: 118 MMHG

## 2022-05-11 DIAGNOSIS — F43.22 ADJUSTMENT DISORDER WITH ANXIOUS MOOD: Primary | ICD-10-CM

## 2022-05-11 DIAGNOSIS — R03.0 ELEVATED BLOOD PRESSURE READING IN OFFICE WITHOUT DIAGNOSIS OF HYPERTENSION: ICD-10-CM

## 2022-05-11 DIAGNOSIS — R51.9 NEW ONSET HEADACHE: ICD-10-CM

## 2022-05-11 DIAGNOSIS — N63.31 MASS OF AXILLARY TAIL OF RIGHT BREAST: ICD-10-CM

## 2022-05-11 PROBLEM — E16.2 HYPOGLYCEMIA: Status: RESOLVED | Noted: 2022-04-27 | Resolved: 2022-05-11

## 2022-05-11 PROCEDURE — 99214 OFFICE O/P EST MOD 30 MIN: CPT | Performed by: INTERNAL MEDICINE

## 2022-05-11 RX ORDER — BUSPIRONE HYDROCHLORIDE 5 MG/1
5 TABLET ORAL 2 TIMES DAILY
Qty: 60 TABLET | Refills: 1 | Status: SHIPPED | OUTPATIENT
Start: 2022-05-11 | End: 2022-08-11

## 2022-05-11 NOTE — PROGRESS NOTES
Internal Medicine Follow Up    Chief Complaint  Sue Hall is a 41 y.o. female who presents today for follow up of chronic medical conditions outlined below.    Chief Complaint   Patient presents with   • Stress     Follow up   • Headache     Follow up   • Breast Mass     Follow up        HPI  Ms. Hall comes in today for follow up of several issues. Still notes breast mass, fluctuates in size. Mammogram not until June. She notes that she still experiences sweaty palms, racing heart, anxiety each morning in the car but generally does not have this issue at other times of the day. Does still get overwhelmed if unable to focus on tasks. She is trying to identify tasks to complete each day and thinks this helps. She is not sure if buspar is helping but admits to missing doses. Headaches seem less frequent. BP today better. Has not gotten a cuff yet. Based on normal labs she plans to cancel appointment with endocrionlogy.       Review of Systems  Review of Systems   Constitutional: Negative.    Respiratory: Negative.    Cardiovascular: Positive for palpitations.   Genitourinary: Positive for breast lump.   Neurological: Positive for headache.   Psychiatric/Behavioral: Positive for stress. The patient is nervous/anxious.         Current Medications  Current Outpatient Medications on File Prior to Visit   Medication Sig Dispense Refill   • Multiple Vitamins-Minerals (MULTIVITAL PO) Take  by mouth.     • [DISCONTINUED] busPIRone (BUSPAR) 5 MG tablet Take 1 tablet by mouth 2 (Two) Times a Day. 60 tablet 0     No current facility-administered medications on file prior to visit.       Allergies  No Known Allergies    Objective  Visit Vitals  /76   Pulse 72   Temp 97 °F (36.1 °C)   Resp 18   Wt 73 kg (161 lb)   SpO2 100%   BMI 26.79 kg/m²        Physical Exam  Physical Exam  Vitals and nursing note reviewed.   Constitutional:       General: She is not in acute distress.     Appearance: She is well-developed. She  is not ill-appearing or toxic-appearing.   HENT:      Head: Normocephalic and atraumatic.   Eyes:      Conjunctiva/sclera: Conjunctivae normal.   Pulmonary:      Effort: Pulmonary effort is normal. No respiratory distress.   Skin:     General: Skin is warm and dry.   Neurological:      General: No focal deficit present.      Mental Status: She is alert and oriented to person, place, and time. Mental status is at baseline.      Gait: Gait normal.   Psychiatric:         Mood and Affect: Mood normal.         Behavior: Behavior normal.         Thought Content: Thought content normal.         Judgment: Judgment normal.         Results  Results for orders placed or performed in visit on 04/27/22   Cortisol    Specimen: Blood   Result Value Ref Range    Cortisol 9.57   mcg/dL   Basic Metabolic Panel    Specimen: Blood   Result Value Ref Range    Glucose 97 65 - 99 mg/dL    BUN 11 6 - 20 mg/dL    Creatinine 0.69 0.57 - 1.00 mg/dL    Sodium 139 136 - 145 mmol/L    Potassium 3.8 3.5 - 5.2 mmol/L    Chloride 103 98 - 107 mmol/L    CO2 24.9 22.0 - 29.0 mmol/L    Calcium 9.9 8.6 - 10.5 mg/dL    BUN/Creatinine Ratio 15.9 7.0 - 25.0    Anion Gap 11.1 5.0 - 15.0 mmol/L    eGFR 110.6 >60.0 mL/min/1.73   TSH Rfx On Abnormal To Free T4    Specimen: Blood   Result Value Ref Range    TSH 2.870 0.270 - 4.200 uIU/mL        Assessment and Plan  Diagnoses and all orders for this visit:    Adjustment disorder with anxious mood  - I believe symptoms are related to stress, anxiety. She is not taking buspar consistently so not sure if it is effective at this time. Recommend better compliance, continue buspar 5mg BID and reassess in 1 month.  - Consider counseling    Elevated blood pressure reading in office without diagnosis of hypertension  - BP normal today, continue to monitor    New onset headache  - Improving, less frequent. I continue to believe these are related to stress and tension. Defer head imaging.    Mass of axillary tail of right  breast  - Will have mammogram in June. Lump fluctuating in size but overall stable.     Health Maintenance  - Pap smear: 7/2021 ASCUS with positive HPV non-16/18. Negative colpo. GYN Aguilar.   - Mammogram: Start screening at age 40.  - Colonoscopy: Start screening at age 45.  - HCV: negative  - Immunizations: COVID UTD. Tdap 9/2020.  - Depression screening: negative 4/2022    Return in about 30 days (around 6/10/2022) for Follow up anxiety, breast lump.

## 2022-05-12 ENCOUNTER — APPOINTMENT (OUTPATIENT)
Dept: NUTRITION | Facility: HOSPITAL | Age: 42
End: 2022-05-12

## 2022-05-12 DIAGNOSIS — N63.32 MASS OF AXILLARY TAIL OF LEFT BREAST: Primary | ICD-10-CM

## 2022-05-31 ENCOUNTER — TELEPHONE (OUTPATIENT)
Dept: INTERNAL MEDICINE | Facility: CLINIC | Age: 42
End: 2022-05-31

## 2022-05-31 NOTE — TELEPHONE ENCOUNTER
Caller: Sue Hall    Relationship to patient: Self    Best call back number: [phone]    Patient is needing: PATIENT STATED THAT SHE WOULD LIKE TO KNOW HOW LONG TO WAIT BETWEEN COVID BOOSTERS    PLEASE ADVISE

## 2022-06-01 ENCOUNTER — TELEPHONE (OUTPATIENT)
Dept: INTERNAL MEDICINE | Facility: CLINIC | Age: 42
End: 2022-06-01

## 2022-06-01 NOTE — TELEPHONE ENCOUNTER
When did symptoms begin? Treatment with antiviral is only indicated if symptoms began within 5 days.

## 2022-06-01 NOTE — TELEPHONE ENCOUNTER
Spoke with patient and patient symptoms began on Saturday.  Informed she needs to treat symptoms with OTC meds such as tylenol, ibuprofen, mucinex.  If more severe symptoms began such as SOA and chest pain to see ED care.  Verbalized understanding.

## 2022-06-01 NOTE — TELEPHONE ENCOUNTER
Caller: Sue Hall    Relationship to patient: Self    Best call back number: [phone]    Date of exposure: 05/19/22; TRAVELED OUT [place] COUN[place]    Date of positive COVID19 test: 05/31/22    Date if possible COVID19 exposure: N A    COVID19 symptoms: COUGHING, SORE THROAT, CONGESTION, DRY MOUTH    Date of initial quarantine: 05/31/22    Additional information or concerns:       What is the patients preferred pharmacy:    [facility] [address] [place] [address] [phone] [facility] [phone]

## 2022-06-08 ENCOUNTER — APPOINTMENT (OUTPATIENT)
Dept: ULTRASOUND IMAGING | Facility: HOSPITAL | Age: 42
End: 2022-06-08

## 2022-06-08 ENCOUNTER — APPOINTMENT (OUTPATIENT)
Dept: MAMMOGRAPHY | Facility: HOSPITAL | Age: 42
End: 2022-06-08

## 2022-06-16 ENCOUNTER — HOSPITAL ENCOUNTER (OUTPATIENT)
Dept: NUTRITION | Facility: HOSPITAL | Age: 42
Setting detail: RECURRING SERIES
Discharge: HOME | End: 2022-06-16

## 2022-06-16 NOTE — PROGRESS NOTES
"Adult Outpatient Nutrition  Follow Up    Patient Name:  Sue Hall  YOB: 1980  MRN: [id number]    Assessment Date:  6/16/2022    Comments:      This medical referred consult was provided via zoom as patient was unable to attend an in-office appointment today due to the COVID-19 crisis. Consent for treatment was given verbally. RD spent a total of 30 minutes with patient today.      Reason for visit:      Patient is a 41 year old female who is referred today for a low blood glucose measurement. Patient shared she has been under significant stress over the past few months due to moving homes and her work. She reported she has been feeling okay. She stated the biggest challenge she is facing is adding protein to her meals. Patient stated she struggles to feel motivated in the morning and often finds herself eating cereal. She also shared she stopped going to the gym a few months ago when she didn't see any weight loss progress. Patient stated she is open to any RD recommendations and ideas.      Session Details:      Attendees: patient  Language/communication details: English is preferred language for teaching  Barriers to learning: none  Health literacy: adequate  Details of home: patient lives at home with her  and two teenage children.     Anthropometrics:          Ht Readings from Last 1 Encounters:   03/02/22 165.1 cm (65\")          Wt Readings from Last 1 Encounters:   03/02/22 72.6 kg (160 lb)          BMI Readings from Last 1 Encounters:   03/02/22 26.63 kg/m²      Ideal body weight: 57 kg (125 lb 10.6 oz)  Adjusted ideal body weight: 63.2 kg (139 lb 6.4 oz)         Pertinent Labs:            Lab Results   Component Value Date     HGBA1C 4.70 (L) 10/19/2021         Pertinent Medications/Supplements:          Current Outpatient Medications   Medication Instructions   • Multiple Vitamins-Minerals (MULTIVITAL PO) Oral         Nutrition Assessment:      Food assistance programs " tiarra  Who prepares most meals: patient or spouse  Who does grocery shopping: patient or spouse   Frequency of eating out: 3-5 times per week  Food allergies/intolerances/aversions: none  Difficulty chewing: none  Difficulty swallowing: none  Gastrointestinal symptoms that impact intake or food choices: none  Diet requirements related to personal preference or cultural belief: none  Support for health: PCP     Diet recall:   Time Food/beverages consumed   7:30 am 8 fl oz black tea, biscotti, 1 cup [place] cereal with milk   8:30 am 8 fl oz black tea   10:30 am 1 corn bread muffin    2:00 pm 1 1/2 cups left over lentil naranjo soup, 1 cup rice   3:00 pm 16 fl oz water, handful peanuts   5:00 pm 1 apple    10:00 pm 1 1/2 cups chicken naranjo, 1 cup rice      Nutrition assessment comments: Patient stated the dietary recall above is representative of a typical day of eating for her recently.     Physical activity:     Patient shared she has not been engaging in activity the past few months due to her schedule and stress from moving houses.      Assessed Needs:      Estimated energy needs: 1229-0317 calories per day ([place] [address] Jeor, activity factor 1.2, subtracting 250-500 calories to promote safe weight loss)     Estimated protein needs: 72 - 86 grams per day (1.0-1.2 grams kg of actual body weight)     PES Statement:      Altered nutrition related lab related to diet and lifestyle a as evidenced by most recent Hgb A1c of 4.7% and patient reporting sympotms of sweating and shaking during the day.        Discussion:     RD discussed the many factors that can contributing to the number on the scale, and encouraged her to continue engaging in consistent activity for her overall health. RD explained that resistance training may play a large role in increasing muscle mass, decreasing fat mass, and promoting a health glucose level. RD provided patient with quick, high protein breakfast ideas, such as protein over night  oats, marissa seed pudding, greek yogurt parfaits, and freezer egg muffins. We also discussed different snack ideas that contain protein. Patient stated she found ideas provided realistic. She stated she would like to spend time looking over the materials and meal ideas provided. RD encouraged patient to contact RD with any questions or appointment inquires. RD also encouraged patient to send RD any diet records she would like RD to evaluate.      Goals:     1) Eat 3 balanced meals and 3 snacks daily - 25%  3) Have 20-25 grams protein with each meal - 50%  3) Have 10 grams protein with each snack - 0%        Total of 30 minutes spent with patient on nutrition counseling. Education based on Academy of Nutrition and Dietetics guidelines. Patient was provided with RD's contact information. Thank you for this referral.       Electronically signed by:  Shakila Pritchard RD  06/16/22 16:26 EDT

## 2022-06-17 NOTE — ADDENDUM NOTE
Encounter addended by: Shakila Pritchard RD on: 6/17/2022 1:16 PM   Actions taken: Clinical Note Signed

## 2022-08-11 ENCOUNTER — TELEPHONE (OUTPATIENT)
Dept: OBSTETRICS AND GYNECOLOGY | Facility: CLINIC | Age: 42
End: 2022-08-11

## 2022-08-11 ENCOUNTER — OFFICE VISIT (OUTPATIENT)
Dept: OBSTETRICS AND GYNECOLOGY | Facility: CLINIC | Age: 42
End: 2022-08-11

## 2022-08-11 VITALS
DIASTOLIC BLOOD PRESSURE: 70 MMHG | BODY MASS INDEX: 26.16 KG/M2 | WEIGHT: 157 LBS | SYSTOLIC BLOOD PRESSURE: 114 MMHG | HEIGHT: 65 IN

## 2022-08-11 DIAGNOSIS — N62 BREAST HYPERTROPHY: ICD-10-CM

## 2022-08-11 DIAGNOSIS — N62 BREAST HYPERTROPHY IN FEMALE: Primary | ICD-10-CM

## 2022-08-11 DIAGNOSIS — R87.610 ATYPICAL SQUAMOUS CELL CHANGES OF UNDETERMINED SIGNIFICANCE (ASCUS) ON CERVICAL CYTOLOGY WITH NEGATIVE HIGH RISK HUMAN PAPILLOMA VIRUS (HPV) TEST RESULT: Primary | ICD-10-CM

## 2022-08-11 DIAGNOSIS — Z01.419 WOMEN'S ANNUAL ROUTINE GYNECOLOGICAL EXAMINATION: ICD-10-CM

## 2022-08-11 DIAGNOSIS — Z01.419 PAP TEST, AS PART OF ROUTINE GYNECOLOGICAL EXAMINATION: ICD-10-CM

## 2022-08-11 DIAGNOSIS — Z12.39 ENCOUNTER FOR BREAST CANCER SCREENING USING NON-MAMMOGRAM MODALITY: ICD-10-CM

## 2022-08-11 DIAGNOSIS — Q83.1 AXILLARY ACCESSORY BREAST TISSUE: ICD-10-CM

## 2022-08-11 PROCEDURE — 99396 PREV VISIT EST AGE 40-64: CPT | Performed by: OBSTETRICS & GYNECOLOGY

## 2022-08-11 PROCEDURE — 99213 OFFICE O/P EST LOW 20 MIN: CPT | Performed by: OBSTETRICS & GYNECOLOGY

## 2022-08-11 NOTE — TELEPHONE ENCOUNTER
She called Dr. Barrett and they do not take her insurance and he said [facility]. Lorelei Osman is who [facility] said to send the referral too.

## 2022-08-11 NOTE — PROGRESS NOTES
Gynecologic Annual Exam Note          GYN Annual Exam     Gynecologic Exam        Subjective     HPI  Sue Hall is a 42 y.o. female, , who presents for annual well woman exam as a established patient . Patient's last menstrual period was 2022 (exact date)..  Periods are regular every 25-35 days, lasting 4-5 days. The flow is moderate. Dysmenorrhea:mild, occurring first 1-2 days of flow. . Patient reports problems with: lump under L arm\. She would like referral for breast reduction.  Partner Status: Marital Status: . She is is sexually active. She has not had new partners.. STD testing recommendations have been explained to the patient and she does not desire STD testing. There were no changes to her medical or surgical history since her last visit..     SHe continues to have back and shoulder pain, breast discomfort when running    Additional OB/GYN History   Current contraception: contraceptive methods: Condoms  Desires to: do not start contraception    Last Pap : 2021. Result: ASCUS. HPV: HPV non 16/18+  Last Completed Pap Smear          Ordered - PAP SMEAR (Every 3 Years) Ordered on 2021  SCANNED - PAP SMEAR    2021  SCANNED - PAP SMEAR    2018  Done - negative- HPV non 16/18 negative    2018  Patient-Reported (Performed Externally)    2016  Done              History of abnormal Pap smear: yes - see above, Colpo 2021 HPV+ repeat in 1 year  Family history of uterine, colon, breast, or ovarian cancer: yes - PGM uterine cancer  Performs monthly Self-Breast Exam: yes  Last mammogram: 2021. Done at .    Last Completed Mammogram     This patient has no relevant Health Maintenance data.          History of abnormal mammogram: no    Colonoscopy: has never had a colonoscopy.  Exercises Regularly: yes  Feelings of Anxiety or Depression: yes - anxious, stopped Buspar  Tobacco Usage?: No       Current Outpatient Medications:   •   "Multiple Vitamins-Minerals (MULTIVITAL PO), Take  by mouth., Disp: , Rfl:      Patient denies the need for medication refills today.    OB History        2    Para   2    Term   2            AB        Living   2       SAB        IAB        Ectopic        Molar        Multiple        Live Births                    Past Medical History:   Diagnosis Date   • Abnormal Pap smear of cervix     Colpo 2021   • Acne    • Cough    • Headache    • Sinusitis    • Sore throat    • Upper respiratory infection         Past Surgical History:   Procedure Laterality Date   •  SECTION     • COLPOSCOPY  2021    ASCUS HPV non 16/18 pap       Health Maintenance   Topic Date Due   • Annual Gynecologic Pelvic and Breast Exam  2022   • ANNUAL PHYSICAL  2022   • INFLUENZA VACCINE  10/01/2022   • PAP SMEAR  2024   • TDAP/TD VACCINES (3 - Td or Tdap) 2030   • HEPATITIS C SCREENING  Completed   • COVID-19 Vaccine  Completed   • Pneumococcal Vaccine 0-64  Aged Out       The additional following portions of the patient's history were reviewed and updated as appropriate: allergies, current medications, past family history, past medical history, past social history, past surgical history and problem list.    Review of Systems   Constitutional: Negative.    HENT: Negative.    Eyes: Negative.    Respiratory: Negative.    Cardiovascular: Negative.    Gastrointestinal: Negative.    Endocrine: Negative.    Genitourinary: Negative.         Lump L underarm    Musculoskeletal: Negative.    Skin: Negative.    Allergic/Immunologic: Negative.    Neurological: Negative.    Hematological: Negative.    Psychiatric/Behavioral: The patient is nervous/anxious.          I have reviewed and agree with the HPI, ROS, and historical information as entered above. Kate Aguilar MD      Objective   /70   Ht 165.1 cm (65\")   Wt 71.2 kg (157 lb)   LMP 2022 (Exact Date)   BMI 26.13 kg/m²     Physical " Exam  Vitals and nursing note reviewed. Exam conducted with a chaperone present.   Constitutional:       Appearance: She is well-developed.   HENT:      Head: Normocephalic and atraumatic.   Neck:      Thyroid: No thyroid mass or thyromegaly.   Cardiovascular:      Rate and Rhythm: Normal rate and regular rhythm.      Heart sounds: No murmur heard.  Pulmonary:      Effort: Pulmonary effort is normal. No retractions.      Breath sounds: Normal breath sounds. No wheezing, rhonchi or rales.   Chest:      Chest wall: No mass or tenderness.   Breasts:      Right: Normal. No mass, nipple discharge, skin change or tenderness.      Left: Normal. No mass, nipple discharge, skin change or tenderness.        Comments: Bilateral accessory breast tissue in axilla but left > right and tender.  Patient feels mass when she squeezes  Abdominal:      General: Bowel sounds are normal.      Palpations: Abdomen is soft. Abdomen is not rigid. There is no mass.      Tenderness: There is no abdominal tenderness. There is no guarding.      Hernia: No hernia is present. There is no hernia in the left inguinal area.   Genitourinary:     Labia:         Right: No rash, tenderness or lesion.         Left: No rash, tenderness or lesion.       Vagina: Normal. No vaginal discharge or lesions.      Cervix: No cervical motion tenderness, discharge, lesion or cervical bleeding.      Uterus: Normal. Not enlarged, not fixed and not tender.       Adnexa:         Right: No mass or tenderness.          Left: No mass or tenderness.        Rectum: No external hemorrhoid.   Musculoskeletal:      Cervical back: Normal range of motion. No muscular tenderness.   Neurological:      Mental Status: She is alert and oriented to person, place, and time.   Psychiatric:         Behavior: Behavior normal.            Assessment and Plan    Problem List Items Addressed This Visit    None     Visit Diagnoses     Atypical squamous cell changes of undetermined significance  (ASCUS) on cervical cytology with negative high risk human papilloma virus (HPV) test result    -  Primary    Relevant Orders    LIQUID-BASED PAP SMEAR, [facility] ([facility])    Pap test, as part of routine gynecological examination        Relevant Orders    LIQUID-BASED PAP SMEAR, [facility] ([facility])    Women's annual routine gynecological examination        Encounter for breast cancer screening using non-mammogram modality        Breast hypertrophy        Relevant Orders    Mammo Diagnostic Digital Tomosynthesis Bilateral With CAD    US Breast Bilateral Complete    Axillary accessory breast tissue        Relevant Orders    Mammo Diagnostic Digital Tomosynthesis Bilateral With CAD    US Breast Bilateral Complete          GYN annual well woman exam.   Pap guidelines reviewed.  Reviewed monthly self breast exams.  Instructed to call with lumps, pain, or breast discharge.    Ordered Mammogram today  Recommended use of Vitamin D replacement and getting adequate calcium in her diet. (1500mg)   Dx mamm ordered today due to her breast issues.  She plans to see plastic surgery for possible reduction which I highly recommend.  Pap with HPV to f/u high risk HPV.  Colposcopy if needed.  Return in about 1 year (around 8/11/2023), or if symptoms worsen or fail to improve.     Kate Aguilar MD  08/11/2022

## 2022-08-13 LAB — REF LAB TEST METHOD: NORMAL

## 2022-09-08 ENCOUNTER — HOSPITAL ENCOUNTER (OUTPATIENT)
Dept: ULTRASOUND IMAGING | Facility: HOSPITAL | Age: 42
Discharge: HOME | End: 2022-09-08

## 2022-09-08 ENCOUNTER — HOSPITAL ENCOUNTER (OUTPATIENT)
Dept: MAMMOGRAPHY | Facility: HOSPITAL | Age: 42
Discharge: HOME | End: 2022-09-08

## 2022-09-08 DIAGNOSIS — N62 BREAST HYPERTROPHY: ICD-10-CM

## 2022-09-08 DIAGNOSIS — N63.32 MASS OF AXILLARY TAIL OF LEFT BREAST: ICD-10-CM

## 2022-09-08 DIAGNOSIS — Q83.1 AXILLARY ACCESSORY BREAST TISSUE: ICD-10-CM

## 2022-09-08 PROCEDURE — 76642 ULTRASOUND BREAST LIMITED: CPT | Performed by: RADIOLOGY

## 2022-09-08 PROCEDURE — 77062 BREAST TOMOSYNTHESIS BI: CPT | Performed by: RADIOLOGY

## 2022-09-08 PROCEDURE — G0279 TOMOSYNTHESIS, MAMMO: HCPCS

## 2022-09-08 PROCEDURE — 77066 DX MAMMO INCL CAD BI: CPT | Performed by: RADIOLOGY

## 2022-09-08 PROCEDURE — 77066 DX MAMMO INCL CAD BI: CPT

## 2022-09-08 PROCEDURE — 76642 ULTRASOUND BREAST LIMITED: CPT

## 2022-09-12 ENCOUNTER — TELEPHONE (OUTPATIENT)
Dept: INTERNAL MEDICINE | Facility: CLINIC | Age: 42
End: 2022-09-12

## 2022-09-12 NOTE — TELEPHONE ENCOUNTER
Caller: Felicia Hall    Relationship: Self    Best call back number:     What is the best time to reach you: ANYTIME    Who are you requesting to speak with (clinical staff, provider,  specific staff member): CLINICAL STAFF    Do you know the name of the person who called: FELICIA    What was the call regarding: PATIENT HAS POSSIBLE PINK EYE; HAD AN APPT TODAY, [id number] BUT CANCELED  DUE TO IT LOOKING BETTER; SHE WAS ASKING IF SHE NEEDED TO MAKE AN APPT FOR TOMORORW    Do you require a callback: YES

## 2022-09-12 NOTE — TELEPHONE ENCOUNTER
Called pt and discussed she could try OTC eye drops if she preferred not to come in at this time. Pt verbalized her understanding and stated she will call on Wednesday if not better.

## 2022-09-14 ENCOUNTER — TELEPHONE (OUTPATIENT)
Dept: INTERNAL MEDICINE | Facility: CLINIC | Age: 42
End: 2022-09-14

## 2022-09-14 NOTE — TELEPHONE ENCOUNTER
When I spoke to pt on 9/12 she insisted she did not want to come in so I had suggested OTC meds.  Per verbal from Dr Murray, pt will need an appt.

## 2022-09-14 NOTE — TELEPHONE ENCOUNTER
Caller: Sue Hall    Relationship: Self    Best call back number: [phone]    What is the best time to reach you: ANYTIME     Who are you requesting to speak with (clinical staff, provider,  specific staff member): NURSE OR CLINICAL STAFF        What was the call regarding: THE PATIENT STATES THAT SHE HAS PINK EYE IN BOTH EYES THE PATIENT STATES THAT THE NURSE TOLD HER TO TREAT THIS OVER THE COUNTER BUT HER PINK EYE HAS GOT WORSE PLEASE CALL PATIENT TO DISCUSS    Do you require a callback: YES

## 2022-09-15 ENCOUNTER — OFFICE VISIT (OUTPATIENT)
Dept: INTERNAL MEDICINE | Facility: CLINIC | Age: 42
End: 2022-09-15

## 2022-09-15 VITALS
SYSTOLIC BLOOD PRESSURE: 152 MMHG | TEMPERATURE: 97.8 F | HEART RATE: 135 BPM | DIASTOLIC BLOOD PRESSURE: 96 MMHG | OXYGEN SATURATION: 98 % | BODY MASS INDEX: 26.33 KG/M2 | WEIGHT: 158 LBS | HEIGHT: 65 IN

## 2022-09-15 DIAGNOSIS — B96.89 BACTERIAL CONJUNCTIVITIS OF BOTH EYES: Primary | ICD-10-CM

## 2022-09-15 DIAGNOSIS — H10.9 BACTERIAL CONJUNCTIVITIS OF BOTH EYES: Primary | ICD-10-CM

## 2022-09-15 PROCEDURE — 99213 OFFICE O/P EST LOW 20 MIN: CPT | Performed by: INTERNAL MEDICINE

## 2022-09-15 RX ORDER — CIPROFLOXACIN HYDROCHLORIDE 3.5 MG/ML
1 SOLUTION/ DROPS TOPICAL 4 TIMES DAILY
Qty: 10 ML | Refills: 0 | Status: SHIPPED | OUTPATIENT
Start: 2022-09-15 | End: 2022-09-22

## 2022-09-15 RX ORDER — CIPROFLOXACIN AND DEXAMETHASONE 3; 1 MG/ML; MG/ML
4 SUSPENSION/ DROPS AURICULAR (OTIC) 2 TIMES DAILY
Status: CANCELLED | OUTPATIENT
Start: 2022-09-15

## 2022-09-15 NOTE — PROGRESS NOTES
"Internal Medicine Acute Visit    Chief Complaint   Patient presents with   • Conjunctivitis     For about four days, tried OTC drops with no benefit.        HPI  Ms. Hall comes in today due to conjunctivitis. Started on Saturday in L eye and has gotten worse since then, now spread to the R eye. She has sensation of something in her eyes. Clear watery drainage. Vision is blurred. Has used OTC pink eye relief drops and then on Monday started to use polytrim eye drops that were prescribed for her  who also had the same thing. She has had temperature to 99 yesterday.       Review of Systems  Review of Systems   Constitutional: Negative.    Eyes: Positive for blurred vision, pain, discharge, redness and visual disturbance.   Hematological: Positive for adenopathy.        Medications  Current Outpatient Medications on File Prior to Visit   Medication Sig Dispense Refill   • Multiple Vitamins-Minerals (MULTIVITAL PO) Take  by mouth.       No current facility-administered medications on file prior to visit.        Allergies  No Known Allergies    PMH  Past Medical History:   Diagnosis Date   • Abnormal Pap smear of cervix     Colpo 7/22/2021   • Acne    • Cough    • Headache    • Sinusitis    • Sore throat    • Upper respiratory infection        Objective  Visit Vitals  /96   Pulse (!) 135   Temp 97.8 °F (36.6 °C)   Ht 165.1 cm (65\")   Wt 71.7 kg (158 lb)   SpO2 98%   BMI 26.29 kg/m²        Physical Exam  Physical Exam  Vitals and nursing note reviewed.   Constitutional:       General: She is in acute distress (upset, crying).      Appearance: She is well-developed. She is not toxic-appearing.   HENT:      Head: Normocephalic and atraumatic.   Eyes:      General:         Right eye: Discharge (clear) present.         Left eye: Discharge (clear) present.     Extraocular Movements: Extraocular movements intact.      Conjunctiva/sclera:      Right eye: Right conjunctiva is injected.      Left eye: Left conjunctiva " is injected.      Pupils: Pupils are equal, round, and reactive to light.      Comments: L eye significantly swollen   Pulmonary:      Effort: Pulmonary effort is normal. No respiratory distress.   Skin:     General: Skin is warm and dry.   Neurological:      Mental Status: She is alert and oriented to person, place, and time. Mental status is at baseline.      Gait: Gait normal.   Psychiatric:         Mood and Affect: Mood is anxious. Affect is tearful.         Results  Results for orders placed or performed in visit on 22   LIQUID-BASED PAP SMEAR, [facility] ([facility])    Specimen: ThinPrep Vial   Result Value Ref Range    Reference Lab Report       [facility]  [address] [place] [address]  Phone: [phone] or [phone]  Fax: [phone]  Camron Rouse M.D., Medical Director    PATIENT NAME                           LABORATORY NO[address]  FELICIA ARMENDARIZ                   [id number]  [id number]                         AGE              SEX  SSN           CLIENT REF #  [facility]                         42      1980  F    [id number]   [id number]    [address]         REQUESTING M.D.     ATTENDING M[place] [address]                FILIPPO SCHMIDT  DATE COLLECTED      DATE RECEIVED      DATE REPORTED  2022    ThinPrep Pap with [facility]    DIAGNOSIS:  Negative for intraepithelial lesion or malignancy    Multiple factors can influence accuracy of Pap tests; therefore, screening at  regular intervals is necessary for early cancer detection.      SPECIMEN ADEQUACY:  SATISFACTORY FOR  EVALUATION  Transformation zone is present.  SOURCE OF SPECIMEN:  CERVICAL  SLIDES:  1  CLINICAL HISTORY:  Atypical squamous cell changes of undetermined  significance (ASCUS) on cervical cytology with negative  high risk human papilloma virus (HPV) test result  Pap test, as part of routine  gynecological examination    HPV  HR-HPV POOL: Positive    The Aptima HPV assay is an in vitro nucleic acid amplification test for the  qualitative detection of E6/E7 viral messenger RNA from 14 high risk types of  HPV in cervical specimens. The high risk HPV types detected include: 16, 18,  31, 33, 35, 39, 45, 51, 52, 56, 58, 59, 66, 68    HPV Genotyping  HPV 16: Negative  HPV 18/45: Negative    The Aptima HPV 16, 18/45 genotype assay is an in vitro nucleic acid  amplification test for the qualitative detection of E6/E7 viral messenger RNA of  human papillomavirus (HPV) types 16,18/45 in cervical specimens from women  with Aptima HPV positive results. The Aptima HPV 16, 18/45 genotype assay  can  differentiate HPV 16 from HPV 18 and/or HPV 45, but does not  differentiate between HPV 18 and HPV 45.    CYTOTECHNOLOGIST:      MARCE MICHELE (ASCP)    CPT CODES:  07610, 77525, 00617          Assessment and Plan  Diagnoses and all orders for this visit:    Bacterial conjunctivitis of both eyes  - almost a week of worsening sxs spread from L eye to R eye. EOMI and PERRL. No objective fever >100.4. Tachycardic and hypertensive today due to emotional distress.  - Concerning that it has not improved after several days of polytrim. Advised she call her eye provider for appt tomorrow to evaluate for other causes. Will go ahead and send in ciprofloxacin eye drops for her to start.  - Warm compresses for symptom relief.        Return if symptoms worsen or fail to improve.

## 2022-09-19 ENCOUNTER — TELEPHONE (OUTPATIENT)
Dept: INTERNAL MEDICINE | Facility: CLINIC | Age: 42
End: 2022-09-19

## 2022-09-19 NOTE — TELEPHONE ENCOUNTER
Called pt and she updated on her recent eye doctor appt that we referred her for.  Instructed to stop the antibiotic and was given a steroid drop for both eyes. Thinks it is viral. Advised to use artificial tears. She has a follow up Wed 9/21.  No questions at this time and will call after her appt on Wed with any updates.

## 2022-09-19 NOTE — TELEPHONE ENCOUNTER
Caller: Sue Hall     Relationship: SELF    Best call back number: [phone]    What is your medical concern? EYE INFECTION IN BOTH EYES    How long has this issue been going on? SINCE 09/11/22    Is your provider already aware of this issue? YES    Have you been treated for this issue? YES, SHE HAS BEEN SENT TO AN [facility]OM[facility] AND WANTS TO UPDATE DR. CREWS AS SHE ASKED TO BE KEPT UP TO DATE.

## 2023-01-06 ENCOUNTER — OFFICE VISIT (OUTPATIENT)
Dept: INTERNAL MEDICINE | Facility: CLINIC | Age: 43
End: 2023-01-06
Payer: COMMERCIAL

## 2023-01-06 ENCOUNTER — LAB (OUTPATIENT)
Dept: LAB | Facility: HOSPITAL | Age: 43
End: 2023-01-06
Payer: COMMERCIAL

## 2023-01-06 VITALS
DIASTOLIC BLOOD PRESSURE: 72 MMHG | OXYGEN SATURATION: 100 % | WEIGHT: 158 LBS | TEMPERATURE: 97.2 F | SYSTOLIC BLOOD PRESSURE: 122 MMHG | HEART RATE: 66 BPM | HEIGHT: 65 IN | BODY MASS INDEX: 26.33 KG/M2

## 2023-01-06 DIAGNOSIS — R87.610 ATYPICAL SQUAMOUS CELLS OF UNDETERMINED SIGNIFICANCE (ASCUS) ON PAPANICOLAOU SMEAR OF CERVIX: ICD-10-CM

## 2023-01-06 DIAGNOSIS — Q83.1 ACCESSORY BREAST TISSUE OF AXILLA: ICD-10-CM

## 2023-01-06 DIAGNOSIS — Z00.00 ANNUAL PHYSICAL EXAM: Primary | ICD-10-CM

## 2023-01-06 DIAGNOSIS — R35.0 URINARY FREQUENCY: ICD-10-CM

## 2023-01-06 DIAGNOSIS — Z00.00 ANNUAL PHYSICAL EXAM: ICD-10-CM

## 2023-01-06 DIAGNOSIS — R03.0 ELEVATED BLOOD PRESSURE READING IN OFFICE WITHOUT DIAGNOSIS OF HYPERTENSION: ICD-10-CM

## 2023-01-06 DIAGNOSIS — F43.22 ADJUSTMENT DISORDER WITH ANXIOUS MOOD: ICD-10-CM

## 2023-01-06 LAB
ALBUMIN SERPL-MCNC: 4.7 G/DL (ref 3.5–5.2)
ALBUMIN/GLOB SERPL: 1.9 G/DL
ALP SERPL-CCNC: 71 U/L (ref 39–117)
ALT SERPL W P-5'-P-CCNC: 18 U/L (ref 1–33)
ANION GAP SERPL CALCULATED.3IONS-SCNC: 10.3 MMOL/L (ref 5–15)
AST SERPL-CCNC: 19 U/L (ref 1–32)
BILIRUB SERPL-MCNC: 0.6 MG/DL (ref 0–1.2)
BUN SERPL-MCNC: 10 MG/DL (ref 6–20)
BUN/CREAT SERPL: 14.3 (ref 7–25)
CALCIUM SPEC-SCNC: 10.1 MG/DL (ref 8.6–10.5)
CHLORIDE SERPL-SCNC: 103 MMOL/L (ref 98–107)
CHOLEST SERPL-MCNC: 158 MG/DL (ref 0–200)
CO2 SERPL-SCNC: 24.7 MMOL/L (ref 22–29)
CREAT SERPL-MCNC: 0.7 MG/DL (ref 0.57–1)
DEPRECATED RDW RBC AUTO: 40.6 FL (ref 37–54)
EGFRCR SERPLBLD CKD-EPI 2021: 109.5 ML/MIN/1.73
ERYTHROCYTE [DISTWIDTH] IN BLOOD BY AUTOMATED COUNT: 12.5 % (ref 12.3–15.4)
GLOBULIN UR ELPH-MCNC: 2.5 GM/DL
GLUCOSE SERPL-MCNC: 82 MG/DL (ref 65–99)
HBA1C MFR BLD: 5.1 % (ref 4.8–5.6)
HCT VFR BLD AUTO: 40 % (ref 34–46.6)
HDLC SERPL-MCNC: 63 MG/DL (ref 40–60)
HGB BLD-MCNC: 13.5 G/DL (ref 12–15.9)
LDLC SERPL CALC-MCNC: 83 MG/DL (ref 0–100)
LDLC/HDLC SERPL: 1.31 {RATIO}
MCH RBC QN AUTO: 29.9 PG (ref 26.6–33)
MCHC RBC AUTO-ENTMCNC: 33.8 G/DL (ref 31.5–35.7)
MCV RBC AUTO: 88.5 FL (ref 79–97)
PLATELET # BLD AUTO: 353 10*3/MM3 (ref 140–450)
PMV BLD AUTO: 10.4 FL (ref 6–12)
POTASSIUM SERPL-SCNC: 4.3 MMOL/L (ref 3.5–5.2)
PROT SERPL-MCNC: 7.2 G/DL (ref 6–8.5)
RBC # BLD AUTO: 4.52 10*6/MM3 (ref 3.77–5.28)
SODIUM SERPL-SCNC: 138 MMOL/L (ref 136–145)
TRIGL SERPL-MCNC: 62 MG/DL (ref 0–150)
VLDLC SERPL-MCNC: 12 MG/DL (ref 5–40)
WBC NRBC COR # BLD: 8.02 10*3/MM3 (ref 3.4–10.8)

## 2023-01-06 PROCEDURE — 80053 COMPREHEN METABOLIC PANEL: CPT

## 2023-01-06 PROCEDURE — 80061 LIPID PANEL: CPT

## 2023-01-06 PROCEDURE — 99396 PREV VISIT EST AGE 40-64: CPT | Performed by: INTERNAL MEDICINE

## 2023-01-06 PROCEDURE — 83036 HEMOGLOBIN GLYCOSYLATED A1C: CPT

## 2023-01-06 PROCEDURE — 85027 COMPLETE CBC AUTOMATED: CPT

## 2023-01-06 NOTE — PROGRESS NOTES
Internal Medicine Annual Exam  Sue Hall is a 42 y.o. female who presents today for an annual exam and with concerns as outlined below.    Chief Complaint  Chief Complaint   Patient presents with   • Annual Exam        HPI  Ms. Hall comes in today for her physical. She is doing well. Stopped buspar as episodes she was having before have lessened. She does note several months of urinary frequency most often in the morning. She feels that she urinates every 30 minutes for a short period in the morning. 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 She does drink tea each morning. She denies other urinary sxs. She is considering breast reduction at . She is up to date with GYN exam, mammogram, dental and vision exams, vaccines. She will need her first colonoscopy this year. Denies use of tobacco, ecigarettes, illicit drugs, and drinks minimal alcohol. She is exercising 5 days per week.         Review of Systems  Review of Systems   Constitutional: Negative.    HENT: Negative for dental problem and hearing loss.    Eyes: Negative.    Respiratory: Negative.    Cardiovascular: Negative.    Gastrointestinal: Negative.    Endocrine: Positive for polyuria.   Genitourinary: Positive for breast lump (accessory breast tissue in L axilla) and frequency. Negative for decreased urine volume, difficulty urinating, dysuria, flank pain, hematuria, pelvic pain, pelvic pressure and urgency.   Musculoskeletal: Negative.    Skin: Negative.    Neurological: Negative.    Psychiatric/Behavioral: The patient is nervous/anxious (less).         Past Medical History  Past Medical History:   Diagnosis Date   • Abnormal Pap smear of cervix     Colpo 2021   • Acne    • Cough    • Headache    • Sinusitis    • Sore throat    • Upper respiratory infection         Surgical History  Past Surgical History:   Procedure Laterality Date   •  SECTION     • COLPOSCOPY  2021    ASCUS HPV non 16/18 pap        Family  History  Family History   Problem Relation Age of Onset   • Hypertension Father    • Diabetes Paternal Grandmother    • Hypertension Paternal Grandmother    • Uterine cancer Paternal Grandmother    • Diabetes Maternal Grandmother    • Hypertension Maternal Grandmother    • Hypertension Maternal Grandfather    • Breast cancer Neg Hx    • Ovarian cancer Neg Hx    • Colon cancer Neg Hx         Social History  Social History     Socioeconomic History   • Marital status:    Tobacco Use   • Smoking status: Never   • Smokeless tobacco: Never   Vaping Use   • Vaping Use: Never used   Substance and Sexual Activity   • Alcohol use: Yes     Comment: rarely   • Drug use: No   • Sexual activity: Yes     Partners: Male     Birth control/protection: Condom        Current Medications  Current Outpatient Medications on File Prior to Visit   Medication Sig Dispense Refill   • Multiple Vitamins-Minerals (MULTIVITAL PO) Take  by mouth.       No current facility-administered medications on file prior to visit.       Allergies  No Known Allergies     Objective  Visit Vitals  /72   Pulse 66   Temp 97.2 °F (36.2 °C)   Ht 165.1 cm (65\")   Wt 71.7 kg (158 lb)   SpO2 100%   BMI 26.29 kg/m²        Physical Exam  Physical Exam  Vitals and nursing note reviewed.   Constitutional:       General: She is not in acute distress.     Appearance: Normal appearance. She is well-developed. She is not ill-appearing, toxic-appearing or diaphoretic.   HENT:      Head: Normocephalic and atraumatic.      Right Ear: Tympanic membrane, ear canal and external ear normal.      Left Ear: Tympanic membrane, ear canal and external ear normal.      Nose: Nose normal.   Eyes:      General: No scleral icterus.     Conjunctiva/sclera: Conjunctivae normal.   Cardiovascular:      Rate and Rhythm: Normal rate and regular rhythm.      Heart sounds: Normal heart sounds. No murmur heard.  Pulmonary:      Effort: Pulmonary effort is normal. No respiratory  distress.      Breath sounds: Normal breath sounds.   Abdominal:      General: There is no distension.      Palpations: Abdomen is soft. There is no mass.      Tenderness: There is no abdominal tenderness.   Musculoskeletal:         General: No deformity.      Cervical back: Neck supple.      Right lower leg: No edema.      Left lower leg: No edema.   Skin:     General: Skin is warm and dry.      Findings: No rash.   Neurological:      Mental Status: She is alert and oriented to person, place, and time. Mental status is at baseline.      Gait: Gait normal.   Psychiatric:         Mood and Affect: Mood normal.         Behavior: Behavior normal.         Thought Content: Thought content normal.         Judgment: Judgment normal.          Results  Results for orders placed or performed in visit on 22   LIQUID-BASED PAP SMEAR, [facility] ([facility])    Specimen: ThinPrep Vial   Result Value Ref Range    Reference Lab Report       [facility]  [address] [place] [address]  Phone: [phone] or [phone]  Fax: [phone]  Camron Rouse M.D., Medical Director    PATIENT NAME                           LABORATORY NO.  127  FELICIA ARMENDARIZ                   [id number]  [id number]                         AGE              SEX  SSN           CLIENT REF #  [facility]                         42      1980  F    [id number]   [id number]    [address]         REQUESTING M.D.     ATTENDING [place] [address]                FILIPPO SCHMIDT  DATE COLLECTED      DATE RECEIVED      DATE REPORTED  2022    ThinPrep Pap with [facility]    DIAGNOSIS:  Negative for intraepithelial lesion or malignancy    Multiple factors can influence accuracy of Pap tests; therefore, screening at  regular intervals is necessary for early cancer detection.      SPECIMEN ADEQUACY:  SATISFACTORY FOR   EVALUATION  Transformation zone is present.  SOURCE OF SPECIMEN:  CERVICAL  SLIDES:  1  CLINICAL HISTORY:  Atypical squamous cell changes of undetermined  significance (ASCUS) on cervical cytology with negative  high risk human papilloma virus (HPV) test result  Pap test, as part of routine gynecological examination    HPV  HR-HPV POOL: Positive    The Aptima HPV assay is an in vitro nucleic acid amplification test for the  qualitative detection of E6/E7 viral messenger RNA from 14 high risk types of  HPV in cervical specimens. The high risk HPV types detected include: 16, 18,  31, 33, 35, 39, 45, 51, 52, 56, 58, 59, 66, 68    HPV Genotyping  HPV 16: Negative  HPV 18/45: Negative    The Aptima HPV 16, 18/45 genotype assay is an in vitro nucleic acid  amplification test for the qualitative detection of E6/E7 viral messenger RNA of  human papillomavirus (HPV) types 16,18/45 in cervical specimens from women  with Aptima HPV positive results. The Aptima HPV 16, 18/45 genotype assay  can  differentiate HPV 16 from HPV 18 and/or HPV 45, but does not  differentiate between HPV 18 and HPV 45.    CYTOTECHNOLOGIST:      MARCE MICHELE (ASCP)    CPT CODES:  77649, 16389, 91199          Assessment and Plan  Diagnoses and all orders for this visit:    Annual physical exam  - Counseling was given to patient for the following topics:  appropriate exercise, healthy eating habits, disease prevention, importance of self breast exam and breast health and importance of immunizations, including risks and benefits. Also discussed the importance of regular dental and vision care.    Adjustment disorder with anxious mood  - off buspar    Atypical squamous cells of undetermined significance (ASCUS) on Papanicolaou smear of cervix  - following with GYN, previous colposcopy negative and most recent pap 8/2022 negative cytology, HPV non-16/18 positive    Accessory breast tissue of axilla  - had diagnostic mammogram in September noting accessory  breast tissue at site of L axillary swelling  - considering breast reduction and has been referred by GYN    Elevated blood pressure reading in office without diagnosis of hypertension  - BP normal today, monitor     Urinary frequency  - x months  - A1c today  - work on kegels and move tea to later in the morning 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    Health Maintenance   Topic Date Due   • INFLUENZA VACCINE  08/01/2022   • ANNUAL PHYSICAL  09/22/2022   • MAMMOGRAM  09/08/2023   • PAP SMEAR  08/11/2025   • TDAP/TD VACCINES (3 - Td or Tdap) 09/16/2030   • HEPATITIS C SCREENING  Completed   • COVID-19 Vaccine  Completed   • Pneumococcal Vaccine 0-64  Aged Out     Health Maintenance  - Pap smear: 7/2021 ASCUS with positive HPV non-16/18. Negative colpo. 8/2022 negative cytology and positive HPV non-16/18. GYN Aguilar.   - Mammogram: 9/2022 BIRADS 1  - Colonoscopy: Start screening at age 45. Discussed today.  - HCV: negative  - Immunizations: COVID and flu UTD. Tdap 9/2020.  - Depression screening: negative 4/2022      Return in about 6 months (around 7/6/2023) for Follow up colonoscopy, 1 year for annual, Labs today.

## 2023-08-11 ENCOUNTER — TRANSCRIBE ORDERS (OUTPATIENT)
Dept: ADMINISTRATIVE | Facility: HOSPITAL | Age: 43
End: 2023-08-11
Payer: COMMERCIAL

## 2023-08-11 DIAGNOSIS — Z12.31 VISIT FOR SCREENING MAMMOGRAM: Primary | ICD-10-CM

## 2023-08-22 ENCOUNTER — OFFICE VISIT (OUTPATIENT)
Dept: OBSTETRICS AND GYNECOLOGY | Facility: CLINIC | Age: 43
End: 2023-08-22
Payer: COMMERCIAL

## 2023-08-22 VITALS
BODY MASS INDEX: 26.49 KG/M2 | HEIGHT: 65 IN | WEIGHT: 159 LBS | SYSTOLIC BLOOD PRESSURE: 118 MMHG | DIASTOLIC BLOOD PRESSURE: 70 MMHG

## 2023-08-22 DIAGNOSIS — Z12.11 COLON CANCER SCREENING: ICD-10-CM

## 2023-08-22 DIAGNOSIS — Z01.419 WOMEN'S ANNUAL ROUTINE GYNECOLOGICAL EXAMINATION: Primary | ICD-10-CM

## 2023-08-22 DIAGNOSIS — M54.6 CHRONIC MIDLINE THORACIC BACK PAIN: ICD-10-CM

## 2023-08-22 DIAGNOSIS — N62 BREAST HYPERTROPHY IN FEMALE: ICD-10-CM

## 2023-08-22 DIAGNOSIS — Z12.39 ENCOUNTER FOR BREAST CANCER SCREENING USING NON-MAMMOGRAM MODALITY: ICD-10-CM

## 2023-08-22 DIAGNOSIS — Z87.42 HX OF ABNORMAL CERVICAL PAP SMEAR: ICD-10-CM

## 2023-08-22 DIAGNOSIS — G89.29 CHRONIC MIDLINE THORACIC BACK PAIN: ICD-10-CM

## 2023-08-22 NOTE — PROGRESS NOTES
Gynecologic Annual Exam Note          GYN Annual Exam     Gynecologic Exam (annual)        Subjective     HPI  Sue Hall is a 43 y.o. female, , who presents for annual well woman exam as a established patient . Patient's last menstrual period was 2023..  Patient reports problems with: upper back pain.  Her periods occur every 25 days , lasting 4-5 days. The flow is moderate.. She reports dysmenorrhea is mild, occurring first 1-2 days of flow. Partner Status: Marital Status: . She is is sexually active. She has not had new partners. STD testing recommendations have been explained to the patient and she does not desire STD testing. There were no changes to her medical or surgical history since her last visit..     Patient would like to discuss having a breast reduction.    Additional OB/GYN History   Current contraception: contraceptive methods: Condoms  Desires to: continue contraception    Last Pap : 22. Result: negative. HPV: HPV pool +.   Last Completed Pap Smear            Ordered - PAP SMEAR (Every 3 Years) Ordered on 2022  LIQUID-BASED PAP SMEAR, [facility] ([facility])    2021  SCANNED - PAP SMEAR    2021  SCANNED - PAP SMEAR    2018  Done - negative- HPV non 16/18 negative    2018  Patient-Reported (Performed Externally)    Only the first 5 history entries have been loaded, but more history exists.                  History of abnormal Pap smear: yes - Colposcopy  (ASCUS, pool +)  Family history of uterine, colon, breast, or ovarian cancer: yes - Uterine Ca- PGM  Performs monthly Self-Breast Exam: yes  Last mammogram: 22. Done at .    Last Completed Mammogram            Scheduled - MAMMOGRAM (Yearly) Scheduled for 2022  Mammo Diagnostic Digital Tomosynthesis Bilateral With CAD    2021  Mammo Screening Digital Tomosynthesis Bilateral With CAD    2020  Mammo Screening Digital  Tomosynthesis Bilateral With CAD    2019  Mammo Screening Digital Tomosynthesis Bilateral With CAD    2018  Mammo Screening Digital Tomosynthesis Bilateral With CAD                    History of abnormal mammogram: no    Colonoscopy: has never had a colonoscopy.  Exercises Regularly: yes  Feelings of Anxiety or Depression: yes - Anxiety, well managed.  Tobacco Usage?: No       Current Outpatient Medications:     Multiple Vitamins-Minerals (MULTIVITAL PO), Take  by mouth., Disp: , Rfl:      Patient denies the need for medication refills today.    OB History          2    Para   2    Term   2            AB        Living   2         SAB        IAB        Ectopic        Molar        Multiple        Live Births   2                Past Medical History:   Diagnosis Date    Abnormal Pap smear of cervix     Colpo 2021    Acne     Cough     Headache     Sinusitis     Sore throat     Upper respiratory infection         Past Surgical History:   Procedure Laterality Date     SECTION      COLPOSCOPY  2021    ASCUS HPV non 16/18 pap       Health Maintenance   Topic Date Due    COLORECTAL CANCER SCREENING  Never done    MAMMOGRAM  2023    INFLUENZA VACCINE  10/01/2023    ANNUAL PHYSICAL  2024    Annual Gynecologic Pelvic and Breast Exam  2024    PAP SMEAR  2025    TDAP/TD VACCINES (3 - Td or Tdap) 2030    HEPATITIS C SCREENING  Completed    COVID-19 Vaccine  Completed    Pneumococcal Vaccine 0-64  Aged Out       The additional following portions of the patient's history were reviewed and updated as appropriate: allergies, current medications, past family history, past medical history, past social history, past surgical history, and problem list.    Review of Systems   Constitutional: Negative.    HENT: Negative.     Eyes: Negative.    Respiratory: Negative.     Cardiovascular: Negative.    Gastrointestinal: Negative.    Endocrine: Negative.    Genitourinary:  "Negative.    Musculoskeletal: Negative.  Back pain: upper back pain.   Skin: Negative.    Allergic/Immunologic: Negative.    Neurological: Negative.    Hematological: Negative.    Psychiatric/Behavioral: Negative.         I have reviewed and agree with the HPI, ROS, and historical information as entered above. Kate Aguilar MD          Objective   /70   Ht 165.1 cm (65\")   Wt 72.1 kg (159 lb)   LMP 08/04/2023   BMI 26.46 kg/mý     Physical Exam  Vitals and nursing note reviewed. Exam conducted with a chaperone present.   Constitutional:       Appearance: She is well-developed.   HENT:      Head: Normocephalic and atraumatic.   Neck:      Thyroid: No thyroid mass or thyromegaly.   Cardiovascular:      Rate and Rhythm: Normal rate and regular rhythm.      Heart sounds: No murmur heard.  Pulmonary:      Effort: Pulmonary effort is normal. No retractions.      Breath sounds: Normal breath sounds. No wheezing, rhonchi or rales.   Chest:      Chest wall: No mass or tenderness.   Breasts:     Right: Normal. No mass, nipple discharge, skin change or tenderness.      Left: Normal. No mass, nipple discharge, skin change or tenderness.   Abdominal:      General: Bowel sounds are normal.      Palpations: Abdomen is soft. Abdomen is not rigid. There is no mass.      Tenderness: There is no abdominal tenderness. There is no guarding.      Hernia: No hernia is present. There is no hernia in the left inguinal area.   Genitourinary:     Labia:         Right: No rash, tenderness or lesion.         Left: No rash, tenderness or lesion.       Vagina: Normal. No vaginal discharge or lesions.      Cervix: No cervical motion tenderness, discharge, lesion or cervical bleeding.      Uterus: Normal. Not enlarged, not fixed and not tender.       Adnexa:         Right: No mass or tenderness.          Left: No mass or tenderness.        Rectum: No external hemorrhoid.   Musculoskeletal:      Cervical back: Normal range of motion. No " muscular tenderness.   Neurological:      Mental Status: She is alert and oriented to person, place, and time.   Psychiatric:         Behavior: Behavior normal.          Assessment and Plan    Problem List Items Addressed This Visit    None  Visit Diagnoses       Women's annual routine gynecological examination    -  Primary    Relevant Orders    LIQUID-BASED PAP SMEAR WITH HPV GENOTYPING REGARDLESS OF INTERPRETATION ([facility])    Hx of abnormal cervical Pap smear        Relevant Orders    LIQUID-BASED PAP SMEAR WITH HPV GENOTYPING REGARDLESS OF INTERPRETATION ([facility])    Encounter for breast cancer screening using non-mammogram modality        Relevant Orders    Mammo Screening Digital Tomosynthesis Bilateral With CAD    Breast hypertrophy in female        Chronic midline thoracic back pain        Colon cancer screening                GYN annual well woman exam.   Pap guidelines reviewed.  Recommended use of Vitamin D replacement and getting adequate calcium in her diet. (1500mg)  Reviewed monthly self breast exams.  Instructed to call with lumps, pain, or breast discharge.    Continue yearly mammography  Encouraged colon cancer screening. Cologard vs colonoscopy.  Upper back pain - she has been approved for breast reduction but insurance recently changed.  I think this would be very beneficial.  Reviewed HPV guidelines.  Reviewed exercise as a preventative health measures.    Return in about 1 year (around 8/22/2024), or if symptoms worsen or fail to improve.     Kate Aguilar MD  08/22/2023

## 2023-08-23 LAB — REF LAB TEST METHOD: NORMAL

## 2023-09-22 ENCOUNTER — HOSPITAL ENCOUNTER (OUTPATIENT)
Dept: MAMMOGRAPHY | Facility: HOSPITAL | Age: 43
Discharge: HOME | End: 2023-09-22
Admitting: OBSTETRICS & GYNECOLOGY
Payer: COMMERCIAL

## 2023-09-22 DIAGNOSIS — Z12.31 VISIT FOR SCREENING MAMMOGRAM: ICD-10-CM

## 2023-09-22 PROCEDURE — 77063 BREAST TOMOSYNTHESIS BI: CPT

## 2023-09-22 PROCEDURE — 77067 SCR MAMMO BI INCL CAD: CPT

## 2023-10-17 ENCOUNTER — TELEPHONE (OUTPATIENT)
Dept: INTERNAL MEDICINE | Facility: CLINIC | Age: 43
End: 2023-10-17

## 2023-10-17 DIAGNOSIS — Z12.11 SCREENING FOR MALIGNANT NEOPLASM OF COLON: Primary | ICD-10-CM

## 2023-10-17 NOTE — TELEPHONE ENCOUNTER
"  Caller: Sue Hall \"Gala\"    Relationship: Self    Best call back number: [phone]     What is the best time to reach you: ANYTIME    Who are you requesting to speak with (clinical staff, provider,  specific staff member): DR CREWS OR HER NURSE    What was the call regarding: PATIENT WOULD LIKE TO SPEAK TO PCP ABOUT COLONOSCOPY      "

## 2023-10-17 NOTE — TELEPHONE ENCOUNTER
Pt wants to know if she is ok to wait till January or if she should get a colonoscopy now or if she is an option for Cologuard.

## 2023-10-18 NOTE — TELEPHONE ENCOUNTER
She can do either colonoscopy or cologuard presuming still no family history of colon cancer or polyps and that she is not having changes to stool or blood in stool. She can let me know which she prefers and I will place the order.

## 2024-01-04 ENCOUNTER — TELEPHONE (OUTPATIENT)
Dept: INTERNAL MEDICINE | Facility: CLINIC | Age: 44
End: 2024-01-04
Payer: COMMERCIAL

## 2024-01-04 NOTE — TELEPHONE ENCOUNTER
"  Caller: Sue Hall \"Gala\"    Relationship: Self    Best call back number: [phone]     What is the best time to reach you: ANY    What was the call regarding: STILL HAS FLU SYMPTOMS WANTS TO KNOW IF NEEDS TO BE [facility]EN[facility] [id number]    Is it okay if the provider responds through MyChart: YES    "

## 2024-01-04 NOTE — TELEPHONE ENCOUNTER
Called pt and advised that she has only been on the medication for a couple of days so she needs to complete the course of antibiotics. Then if the symptoms are still there or they get worse she needs to call and make an appt for re-evaluation. Pt verbalized her understanding.

## 2024-01-19 ENCOUNTER — OFFICE VISIT (OUTPATIENT)
Dept: INTERNAL MEDICINE | Facility: CLINIC | Age: 44
End: 2024-01-19
Payer: COMMERCIAL

## 2024-01-19 ENCOUNTER — LAB (OUTPATIENT)
Dept: LAB | Facility: HOSPITAL | Age: 44
End: 2024-01-19
Payer: COMMERCIAL

## 2024-01-19 VITALS
WEIGHT: 156.8 LBS | RESPIRATION RATE: 18 BRPM | BODY MASS INDEX: 26.12 KG/M2 | HEART RATE: 82 BPM | HEIGHT: 65 IN | SYSTOLIC BLOOD PRESSURE: 120 MMHG | DIASTOLIC BLOOD PRESSURE: 82 MMHG | OXYGEN SATURATION: 99 % | TEMPERATURE: 96.8 F

## 2024-01-19 DIAGNOSIS — Q83.1 ACCESSORY BREAST TISSUE OF AXILLA: ICD-10-CM

## 2024-01-19 DIAGNOSIS — Z00.00 ANNUAL PHYSICAL EXAM: Primary | ICD-10-CM

## 2024-01-19 DIAGNOSIS — R87.610 ATYPICAL SQUAMOUS CELLS OF UNDETERMINED SIGNIFICANCE (ASCUS) ON PAPANICOLAOU SMEAR OF CERVIX: ICD-10-CM

## 2024-01-19 DIAGNOSIS — L65.9 ALOPECIA: ICD-10-CM

## 2024-01-19 DIAGNOSIS — Z00.00 ANNUAL PHYSICAL EXAM: ICD-10-CM

## 2024-01-19 PROBLEM — F43.22 ADJUSTMENT DISORDER WITH ANXIOUS MOOD: Status: RESOLVED | Noted: 2022-04-27 | Resolved: 2024-01-19

## 2024-01-19 PROBLEM — R03.0 ELEVATED BLOOD PRESSURE READING IN OFFICE WITHOUT DIAGNOSIS OF HYPERTENSION: Status: RESOLVED | Noted: 2022-04-27 | Resolved: 2024-01-19

## 2024-01-19 PROBLEM — R35.0 URINARY FREQUENCY: Status: RESOLVED | Noted: 2023-01-06 | Resolved: 2024-01-19

## 2024-01-19 LAB
25(OH)D3 SERPL-MCNC: 30.1 NG/ML (ref 30–100)
ALBUMIN SERPL-MCNC: 4.5 G/DL (ref 3.5–5.2)
ALBUMIN/GLOB SERPL: 1.9 G/DL
ALP SERPL-CCNC: 54 U/L (ref 39–117)
ALT SERPL W P-5'-P-CCNC: 20 U/L (ref 1–33)
ANION GAP SERPL CALCULATED.3IONS-SCNC: 12 MMOL/L (ref 5–15)
AST SERPL-CCNC: 22 U/L (ref 1–32)
BILIRUB SERPL-MCNC: 1 MG/DL (ref 0–1.2)
BUN SERPL-MCNC: 11 MG/DL (ref 6–20)
BUN/CREAT SERPL: 12.8 (ref 7–25)
CALCIUM SPEC-SCNC: 9.8 MG/DL (ref 8.6–10.5)
CHLORIDE SERPL-SCNC: 105 MMOL/L (ref 98–107)
CHOLEST SERPL-MCNC: 154 MG/DL (ref 0–200)
CO2 SERPL-SCNC: 23 MMOL/L (ref 22–29)
CREAT SERPL-MCNC: 0.86 MG/DL (ref 0.57–1)
DEPRECATED RDW RBC AUTO: 39.8 FL (ref 37–54)
EGFRCR SERPLBLD CKD-EPI 2021: 85 ML/MIN/1.73
ERYTHROCYTE [DISTWIDTH] IN BLOOD BY AUTOMATED COUNT: 12.3 % (ref 12.3–15.4)
FERRITIN SERPL-MCNC: 54.4 NG/ML (ref 13–150)
GLOBULIN UR ELPH-MCNC: 2.4 GM/DL
GLUCOSE SERPL-MCNC: 80 MG/DL (ref 65–99)
HBA1C MFR BLD: 5.4 % (ref 4.8–5.6)
HCT VFR BLD AUTO: 40.8 % (ref 34–46.6)
HDLC SERPL-MCNC: 59 MG/DL (ref 40–60)
HGB BLD-MCNC: 13.7 G/DL (ref 12–15.9)
IRON 24H UR-MRATE: 125 MCG/DL (ref 37–145)
IRON SATN MFR SERPL: 33 % (ref 20–50)
LDLC SERPL CALC-MCNC: 81 MG/DL (ref 0–100)
LDLC/HDLC SERPL: 1.36 {RATIO}
MCH RBC QN AUTO: 30 PG (ref 26.6–33)
MCHC RBC AUTO-ENTMCNC: 33.6 G/DL (ref 31.5–35.7)
MCV RBC AUTO: 89.5 FL (ref 79–97)
PLATELET # BLD AUTO: 328 10*3/MM3 (ref 140–450)
PMV BLD AUTO: 10.2 FL (ref 6–12)
POTASSIUM SERPL-SCNC: 4.4 MMOL/L (ref 3.5–5.2)
PROT SERPL-MCNC: 6.9 G/DL (ref 6–8.5)
RBC # BLD AUTO: 4.56 10*6/MM3 (ref 3.77–5.28)
SODIUM SERPL-SCNC: 140 MMOL/L (ref 136–145)
TIBC SERPL-MCNC: 378 MCG/DL (ref 298–536)
TRANSFERRIN SERPL-MCNC: 254 MG/DL (ref 200–360)
TRIGL SERPL-MCNC: 75 MG/DL (ref 0–150)
TSH SERPL DL<=0.05 MIU/L-ACNC: 3.43 UIU/ML (ref 0.27–4.2)
VLDLC SERPL-MCNC: 14 MG/DL (ref 5–40)
WBC NRBC COR # BLD AUTO: 6.94 10*3/MM3 (ref 3.4–10.8)

## 2024-01-19 PROCEDURE — 82306 VITAMIN D 25 HYDROXY: CPT

## 2024-01-19 PROCEDURE — 84466 ASSAY OF TRANSFERRIN: CPT

## 2024-01-19 PROCEDURE — 83540 ASSAY OF IRON: CPT

## 2024-01-19 PROCEDURE — 80061 LIPID PANEL: CPT

## 2024-01-19 PROCEDURE — 82728 ASSAY OF FERRITIN: CPT

## 2024-01-19 PROCEDURE — 83036 HEMOGLOBIN GLYCOSYLATED A1C: CPT

## 2024-01-19 PROCEDURE — 99396 PREV VISIT EST AGE 40-64: CPT | Performed by: INTERNAL MEDICINE

## 2024-01-19 PROCEDURE — 80050 GENERAL HEALTH PANEL: CPT

## 2024-01-19 NOTE — PROGRESS NOTES
Internal Medicine Annual Exam  Sue Hall is a 43 y.o. female who presents today for an annual exam and with concerns as outlined below.    Chief Complaint  Chief Complaint   Patient presents with    Annual Exam        HPI  Ms. Hall comes in today for her physical. She is doing well but notes several months of increased hair loss. Notes clumps of hair when she washes her hair. She has been washing her hair once a week in the winter. Had flu and ear infection at beginning of January but this has resolved with augmentin and tamiflu. She is up to date with GYN exam, mammogram, dental and vision exams, vaccines with exception of COVID19. She defers COVID19 today due to plans over the weekend but will return next week. She did cologuard 10/2023 which was negative. She did consult with plastic surgery at  for breast reduction but insurance changed so she has been hesitant to start the process over with new insurance. She is not sure if she wants to pursue the surgery. She does have back pain. Denies use of tobacco, ecigarettes, illicit drugs, and drinks 5 alcohol containing beverages per week.           Review of Systems  Review of Systems   Constitutional: Negative.    HENT:  Negative for hearing loss.    Respiratory: Negative.     Cardiovascular: Negative.    Gastrointestinal: Negative.    Genitourinary:  Positive for frequency (chronic). Negative for decreased urine volume and difficulty urinating.   Musculoskeletal:  Positive for back pain.   Skin:         +alopecia   Neurological: Negative.    Psychiatric/Behavioral: Negative.          Past Medical History  Past Medical History:   Diagnosis Date    Abnormal Pap smear of cervix     Colpo 7/22/2021    Acne     Adjustment disorder with anxious mood 04/27/2022    Cough     Elevated blood pressure reading in office without diagnosis of hypertension 04/27/2022    Headache     Sinusitis     Sore throat     Upper respiratory infection         Surgical  "History  Past Surgical History:   Procedure Laterality Date     SECTION      COLPOSCOPY  2021    ASCUS HPV non 16/18 pap        Family History  Family History   Problem Relation Age of Onset    Hypertension Father     Coronary artery disease Father         had CABG    Diabetes Maternal Grandmother     Hypertension Maternal Grandmother     Hypertension Maternal Grandfather     Diabetes Paternal Grandmother     Hypertension Paternal Grandmother     Uterine cancer Paternal Grandmother     Breast cancer Neg Hx     Ovarian cancer Neg Hx     Colon cancer Neg Hx         Social History  Social History     Socioeconomic History    Marital status:    Tobacco Use    Smoking status: Never    Smokeless tobacco: Never   Vaping Use    Vaping Use: Never used   Substance and Sexual Activity    Alcohol use: Yes     Alcohol/week: 2.0 standard drinks of alcohol     Types: 2 Standard drinks or equivalent per week     Comment: 2 drinks on weekend    Drug use: No    Sexual activity: Yes     Partners: Male     Birth control/protection: Condom        Current Medications  Current Outpatient Medications on File Prior to Visit   Medication Sig Dispense Refill    Multiple Vitamins-Minerals (MULTIVITAL PO) Take  by mouth.      [DISCONTINUED] amoxicillin (AMOXIL) 875 MG tablet Take 1 tablet by mouth 2 (Two) Times a Day. (Patient not taking: Reported on 2024) 20 tablet 0    [DISCONTINUED] oseltamivir (TAMIFLU) 75 MG capsule Take 1 capsule by mouth Every 12 (Twelve) Hours. (Patient not taking: Reported on 2024) 10 capsule 0     No current facility-administered medications on file prior to visit.       Allergies  No Known Allergies     Objective  Visit Vitals  /82 (BP Location: Left arm, Patient Position: Sitting, Cuff Size: Adult)   Pulse 82   Temp 96.8 °F (36 °C) (Temporal)   Resp 18   Ht 165.1 cm (65\")   Wt 71.1 kg (156 lb 12.8 oz)   LMP 2023   SpO2 99%   BMI 26.09 kg/m²        Physical Exam  Physical " Exam  Vitals and nursing note reviewed.   Constitutional:       General: She is not in acute distress.     Appearance: She is well-developed. She is not ill-appearing, toxic-appearing or diaphoretic.   HENT:      Head: Normocephalic and atraumatic.      Right Ear: Tympanic membrane, ear canal and external ear normal.      Left Ear: Tympanic membrane, ear canal and external ear normal.      Nose: Nose normal.   Eyes:      General: No scleral icterus.     Conjunctiva/sclera: Conjunctivae normal.   Neck:      Thyroid: No thyromegaly.   Cardiovascular:      Rate and Rhythm: Normal rate and regular rhythm.      Heart sounds: Normal heart sounds. No murmur heard.  Pulmonary:      Effort: Pulmonary effort is normal. No respiratory distress.      Breath sounds: Normal breath sounds.   Abdominal:      General: There is no distension.      Palpations: Abdomen is soft. There is no mass.      Tenderness: There is no abdominal tenderness.   Musculoskeletal:         General: No deformity.      Cervical back: Neck supple.      Right lower leg: No edema.      Left lower leg: No edema.   Lymphadenopathy:      Cervical: No cervical adenopathy.   Skin:     General: Skin is warm and dry.      Findings: No rash.      Comments: Scalp appears normal without scarring or rash   Neurological:      Mental Status: She is alert and oriented to person, place, and time. Mental status is at baseline.      Gait: Gait normal.   Psychiatric:         Mood and Affect: Mood normal.         Behavior: Behavior normal.         Thought Content: Thought content normal.         Judgment: Judgment normal.          Results  Results for orders placed or performed during the hospital encounter of 01/01/24   POC Rapid Strep A    Specimen: Swab   Result Value Ref Range    Rapid Strep A Screen Negative     Internal Control Passed     Lot Number [id number]     Expiration Date 07/11/2026    Covid-19 + Flu A&B AG, Veritor ([id number])    Specimen: Swab   Result Value Ref  Range    SARS Antigen Not Detected Not Detected, Presumptive Negative    Influenza A Antigen ISABEL Detected (A) Not Detected    Influenza B Antigen ISABEL Not Detected Not Detected    Internal Control Passed Passed    Lot Number [id number]     Expiration Date 11/10/2024         Assessment and Plan  Diagnoses and all orders for this visit:    Annual physical exam  - Counseling was given to patient for the following topics:  importance of self breast exam and breast health and importance of immunizations, including risks and benefits. Also discussed the importance of regular dental and vision care.  -     CBC (No Diff); Future  -     Comprehensive Metabolic Panel; Future  -     Lipid Panel; Future  -     Hemoglobin A1c; Future    Atypical squamous cells of undetermined significance (ASCUS) on Papanicolaou smear of cervix  - following with GYN, previous colposcopy negative and most recent pap 8/2023 negative cytology, HPV negative    Accessory breast tissue of axilla  - was referred to plastic surgery by GYN and considering breast reduction however changed insurance and has not returned. She is still considering breast reduction due to back pain.    Alopecia  - exam unremarkable  - will check TSH, vitamin D, iron levels         Health Maintenance   Topic Date Due    BMI FOLLOWUP  03/02/2023    INFLUENZA VACCINE  08/01/2023    COVID-19 Vaccine (5 - 2023-24 season) 09/01/2023    ANNUAL PHYSICAL  01/06/2024    MAMMOGRAM  09/22/2024    PAP SMEAR  08/22/2026    COLORECTAL CANCER SCREENING  10/23/2026    TDAP/TD VACCINES (3 - Td or Tdap) 09/16/2030    HEPATITIS C SCREENING  Completed    Pneumococcal Vaccine 0-64  Aged Out     Health Maintenance  - Pap smear: 8/2023 negative cytology and HPV. GYN Aguilar.   - Mammogram: 9/2023 BIRADS 1  - Colonoscopy: cologuard 10/2023 negative  - HCV: negative  - Immunizations: COVID discussed. flu UTD. Tdap 9/2020.  - Depression screening: negative 1/2024    Return in about 1 year (around  1/19/2025) for Annual, Labs today.

## 2024-08-07 ENCOUNTER — TRANSCRIBE ORDERS (OUTPATIENT)
Dept: ADMINISTRATIVE | Facility: HOSPITAL | Age: 44
End: 2024-08-07
Payer: COMMERCIAL

## 2024-08-07 DIAGNOSIS — Z12.31 SCREENING MAMMOGRAM FOR BREAST CANCER: Primary | ICD-10-CM

## 2024-08-27 ENCOUNTER — TELEPHONE (OUTPATIENT)
Dept: INTERNAL MEDICINE | Facility: CLINIC | Age: 44
End: 2024-08-27

## 2024-08-27 ENCOUNTER — OFFICE VISIT (OUTPATIENT)
Dept: OBSTETRICS AND GYNECOLOGY | Facility: CLINIC | Age: 44
End: 2024-08-27
Payer: COMMERCIAL

## 2024-08-27 VITALS
BODY MASS INDEX: 26.46 KG/M2 | DIASTOLIC BLOOD PRESSURE: 80 MMHG | HEIGHT: 65 IN | SYSTOLIC BLOOD PRESSURE: 124 MMHG | WEIGHT: 158.8 LBS

## 2024-08-27 DIAGNOSIS — Z87.42 HISTORY OF ABNORMAL CERVICAL PAP SMEAR: ICD-10-CM

## 2024-08-27 DIAGNOSIS — Z01.419 WOMEN'S ANNUAL ROUTINE GYNECOLOGICAL EXAMINATION: Primary | ICD-10-CM

## 2024-08-27 DIAGNOSIS — B97.7 HPV IN FEMALE: ICD-10-CM

## 2024-08-27 DIAGNOSIS — Z12.39 ENCOUNTER FOR BREAST CANCER SCREENING USING NON-MAMMOGRAM MODALITY: ICD-10-CM

## 2024-08-27 PROCEDURE — 99396 PREV VISIT EST AGE 40-64: CPT | Performed by: OBSTETRICS & GYNECOLOGY

## 2024-08-27 RX ORDER — ESTRADIOL 10 UG/1
1 INSERT VAGINAL 2 TIMES WEEKLY
Qty: 8 TABLET | Refills: 11 | Status: SHIPPED | OUTPATIENT
Start: 2024-08-29 | End: 2025-07-31

## 2024-08-27 NOTE — PROGRESS NOTES
Gynecologic Annual Exam Note          GYN Annual Exam     Gynecologic Exam        Subjective     HPI  Sue Hall is a 44 y.o. female, , who presents for annual well woman exam as a established patient. There were no changes to her medical or surgical history since her last visit. Patient's last menstrual period was 2024 (within days). Her periods occur every 25-35 days, lasting 4 days.  The flow is moderate. She denies dysmenorrhea. Marital Status: . She is sexually active. She has not had new partners. STD testing recommendations have been explained to the patient and she declines STD testing.    The patient would like to discuss the following complaints today: Urinary frequency, Hot flashes, and Night sweats. Patient states that she does not think she is able to hold her bladder as long as she previously could. Patient denies ARLEEN. Patient states that her periods are not lasting as long and her menstrual flow is getting lighter. Patient reports having hot flashes and night sweats every night for the last 2-3 months.      Patient reports mild anxiety and insomnia since moving her daughter into college at [employer].     Additional OB/GYN History   contraceptive methods: Condoms  Desires to: do not start contraception  History of migraines: no    Last Pap : 2023. Result: negative. HPV: negative. H/o HPV pool+ in  with negative cervical cells.  Last Completed Pap Smear            Ordered - PAP SMEAR (Every 3 Years) Ordered on 2023  LIQUID-BASED PAP SMEAR WITH HPV GENOTYPING REGARDLESS OF INTERPRETATION ([facility])    2022  LIQUID-BASED PAP SMEAR, [facility] ([facility])    2021  SCANNED - PAP SMEAR    2021  SCANNED - PAP SMEAR    2018  Done - negative- HPV non 16/18 negative    Only the first 5 history entries have been loaded, but more history exists.                  History of abnormal Pap smear:  Yes  Family history of uterine,  colon, breast, or ovarian cancer: yes - PGM- uterine CA  Performs monthly Self-Breast Exam: yes  Last mammogram: 2023. Done at . There is a copy in the chart.  Last Completed Mammogram       This patient has no relevant Health Maintenance data.            Colonoscopy: has had a cologuard 10/23/2023- negative   Exercises Regularly: yes  Feelings of Anxiety or Depression: no  Tobacco Usage?: No       Current Outpatient Medications:     Multiple Vitamins-Minerals (MULTIVITAL PO), Take  by mouth., Disp: , Rfl:     [START ON 2024] estradiol (Vagifem) 10 MCG tablet vaginal tablet, Insert 1 tablet into the vagina 2 (Two) Times a Week for 336 days., Disp: 8 tablet, Rfl: 11     Patient denies the need for medication refills today.    OB History          2    Para   2    Term   2            AB        Living   2         SAB        IAB        Ectopic        Molar        Multiple        Live Births   2                Past Medical History:   Diagnosis Date    Abnormal Pap smear of cervix     Colpo 2021    Acne     Adjustment disorder with anxious mood 2022    Cough     Elevated blood pressure reading in office without diagnosis of hypertension 2022    Headache     Sinusitis     Sore throat     Upper respiratory infection         Past Surgical History:   Procedure Laterality Date     SECTION      COLPOSCOPY  2021    ASCUS HPV non 16/18 pap       Health Maintenance   Topic Date Due    COVID-19 Vaccine (2023- season) 2023    Annual Gynecologic Pelvic and Breast Exam  2024    INFLUENZA VACCINE  2024    MAMMOGRAM  2024    ANNUAL PHYSICAL  2025    BMI FOLLOWUP  2025    PAP SMEAR  2026    COLORECTAL CANCER SCREENING  10/23/2026    TDAP/TD VACCINES (3 - Td or Tdap) 2030    HEPATITIS C SCREENING  Completed    Pneumococcal Vaccine 0-64  Aged Out       The additional following portions of the patient's history were reviewed and  "updated as appropriate: allergies, current medications, past family history, past medical history, past social history, past surgical history, and problem list.    Review of Systems   Constitutional: Negative.    HENT: Negative.     Eyes: Negative.    Respiratory: Negative.     Cardiovascular: Negative.    Gastrointestinal: Negative.    Endocrine: Positive for heat intolerance (Hot flashes and Night sweats).   Genitourinary:  Positive for frequency.   Musculoskeletal: Negative.    Skin: Negative.    Allergic/Immunologic: Negative.    Neurological: Negative.    Hematological: Negative.    Psychiatric/Behavioral: Negative.           I have reviewed and agree with the HPI, ROS, and historical information as entered above. Kate Aguilar MD          Objective   /80   Ht 165.1 cm (65\")   Wt 72 kg (158 lb 12.8 oz)   LMP 08/06/2024 (Within Days)   BMI 26.43 kg/m²     Physical Exam  Vitals and nursing note reviewed. Exam conducted with a chaperone present.   Constitutional:       Appearance: She is well-developed.   HENT:      Head: Normocephalic and atraumatic.   Neck:      Thyroid: No thyroid mass or thyromegaly.   Cardiovascular:      Rate and Rhythm: Normal rate and regular rhythm.      Heart sounds: No murmur heard.  Pulmonary:      Effort: Pulmonary effort is normal. No retractions.      Breath sounds: Normal breath sounds. No wheezing, rhonchi or rales.   Chest:      Chest wall: No mass or tenderness.   Breasts:     Right: Normal. No mass, nipple discharge, skin change or tenderness.      Left: Normal. No mass, nipple discharge, skin change or tenderness.   Abdominal:      General: Bowel sounds are normal.      Palpations: Abdomen is soft. Abdomen is not rigid. There is no mass.      Tenderness: There is no abdominal tenderness. There is no guarding.      Hernia: No hernia is present. There is no hernia in the left inguinal area.   Genitourinary:     Labia:         Right: No rash, tenderness or lesion.    "      Left: No rash, tenderness or lesion.       Vagina: Normal. No vaginal discharge or lesions.      Cervix: No cervical motion tenderness, discharge, lesion or cervical bleeding.      Uterus: Normal. Not enlarged, not fixed and not tender.       Adnexa:         Right: No mass or tenderness.          Left: No mass or tenderness.        Rectum: No external hemorrhoid.   Musculoskeletal:      Cervical back: Normal range of motion. No muscular tenderness.   Neurological:      Mental Status: She is alert and oriented to person, place, and time.   Psychiatric:         Behavior: Behavior normal.            Assessment and Plan    Problem List Items Addressed This Visit    None  Visit Diagnoses       Women's annual routine gynecological examination    -  Primary    Relevant Orders    LIQUID-BASED PAP SMEAR WITH HPV GENOTYPING REGARDLESS OF INTERPRETATION ([facility])    History of abnormal cervical Pap smear        Relevant Orders    LIQUID-BASED PAP SMEAR WITH HPV GENOTYPING REGARDLESS OF INTERPRETATION ([facility])    HPV in female        Relevant Orders    LIQUID-BASED PAP SMEAR WITH HPV GENOTYPING REGARDLESS OF INTERPRETATION ([facility])    Encounter for breast cancer screening using non-mammogram modality        Relevant Orders    Mammo Screening Digital Tomosynthesis Bilateral With CAD            GYN annual well woman exam.   Pap guidelines reviewed.  Recommended use of Vitamin D replacement and getting adequate calcium in her diet. (1500mg)  Reviewed monthly self breast exams.  Instructed to call with lumps, pain, or breast discharge.    Continue yearly mammography  Reviewed HPV guidelines.  Reviewed exercise as a preventative health measures.   Anticipatory menopausal guidance given.  Discussed options both OTC, non hormonal and hormonal.  Rec starting vaginal estrogen for dryness and prevention of KARI.  Info on non hormonal options for other perimenopausal sx she is experiencing now.  Return in about 1 year  (around 8/27/2025).     Kate Aguilar MD  08/27/2024

## 2024-09-03 ENCOUNTER — TELEPHONE (OUTPATIENT)
Dept: INTERNAL MEDICINE | Facility: CLINIC | Age: 44
End: 2024-09-03
Payer: COMMERCIAL

## 2024-09-03 NOTE — TELEPHONE ENCOUNTER
"    Caller: Sue Hall \"Gala\"    Relationship to patient: Self    Best call back number: [phone]    Date of positive COVID19 test: NA    Date of possible COVID19 exposure: SUNDAY 09/01/2024    COVID19 symptoms: SORE THROAT BODY ACHES CHILLS HEADACHE       Additional information or concerns: THE PATIENT STATES THAT HE SON HAS COVID SHE WOULD LIKE TO KNOW IS SHE CAN BE TESTED AT THE OFFICE OR IF SHE SHOULD GET A PRESCRIPTION SENT TO THE [facility] FOR A HOME TEST     What is the patients preferred pharmacy: [facility]         "

## 2024-09-04 ENCOUNTER — OFFICE VISIT (OUTPATIENT)
Dept: INTERNAL MEDICINE | Facility: CLINIC | Age: 44
End: 2024-09-04
Payer: COMMERCIAL

## 2024-09-04 VITALS
TEMPERATURE: 98.8 F | OXYGEN SATURATION: 98 % | BODY MASS INDEX: 26.33 KG/M2 | DIASTOLIC BLOOD PRESSURE: 74 MMHG | SYSTOLIC BLOOD PRESSURE: 122 MMHG | HEIGHT: 65 IN | WEIGHT: 158 LBS

## 2024-09-04 DIAGNOSIS — R05.1 ACUTE COUGH: ICD-10-CM

## 2024-09-04 DIAGNOSIS — J02.9 SORE THROAT: Primary | ICD-10-CM

## 2024-09-04 DIAGNOSIS — U07.1 COVID-19 VIRUS DETECTED: ICD-10-CM

## 2024-09-04 LAB
EXPIRATION DATE: ABNORMAL
EXPIRATION DATE: NORMAL
FLUAV AG UPPER RESP QL IA.RAPID: NOT DETECTED
FLUBV AG UPPER RESP QL IA.RAPID: NOT DETECTED
INTERNAL CONTROL: ABNORMAL
INTERNAL CONTROL: NORMAL
Lab: ABNORMAL
Lab: NORMAL
REF LAB TEST METHOD: NORMAL
S PYO AG THROAT QL: NEGATIVE
SARS-COV-2 AG UPPER RESP QL IA.RAPID: DETECTED

## 2024-09-04 PROCEDURE — 87428 SARSCOV & INF VIR A&B AG IA: CPT | Performed by: INTERNAL MEDICINE

## 2024-09-04 PROCEDURE — 87880 STREP A ASSAY W/OPTIC: CPT | Performed by: INTERNAL MEDICINE

## 2024-09-04 PROCEDURE — 99213 OFFICE O/P EST LOW 20 MIN: CPT | Performed by: INTERNAL MEDICINE

## 2024-09-04 RX ORDER — BENZONATATE 100 MG/1
100 CAPSULE ORAL 3 TIMES DAILY PRN
Qty: 30 CAPSULE | Refills: 0 | Status: SHIPPED | OUTPATIENT
Start: 2024-09-04

## 2024-09-04 NOTE — PROGRESS NOTES
"Fernandez Hall is a 44 y.o. female.   Chief Complaint   Patient presents with    Sore Throat     Started monday    Generalized Body Aches    Exposure To Known Illness     Son has covid diagnosed sunday    Cough       History of Present Illness   Sore throat, cough, fever, and body aches started Monday. Her son was diagnosed with Covid.   The following portions of the patient's history were reviewed and updated as appropriate: allergies, current medications, past family history, past medical history, past social history, past surgical history, and problem list.    Review of Systems   Constitutional:  Positive for fever.   HENT:  Positive for sore throat. Negative for sinus pressure and sinus pain.    Respiratory:  Positive for cough.    Cardiovascular:  Negative for chest pain.   Gastrointestinal:  Negative for diarrhea and vomiting.     /74 (BP Location: Left arm, Patient Position: Sitting, Cuff Size: Adult)   Temp 98.8 °F (37.1 °C) (Temporal)   Ht 165 cm (64.96\")   Wt 71.7 kg (158 lb)   LMP 08/06/2024 (Within Days)   SpO2 98%   BMI 26.32 kg/m²     Objective   Physical Exam  Vitals reviewed.   HENT:      Mouth/Throat:      Pharynx: Posterior oropharyngeal erythema present. No oropharyngeal exudate.   Cardiovascular:      Rate and Rhythm: Normal rate and regular rhythm.   Pulmonary:      Effort: No respiratory distress.      Breath sounds: No wheezing.   Neurological:      Mental Status: She is alert and oriented to person, place, and time.         Assessment & Plan   Diagnoses and all orders for this visit:    1. Sore throat (Primary)  Strep negative. Can use Chloraseptic spray and throat lozenges.   2. Acute cough  -     benzonatate (Tessalon Perles) 100 MG capsule; Take 1 capsule by mouth 3 (Three) Times a Day As Needed for Cough.  Dispense: 30 capsule; Refill: 0  Flu A and B negative.   3. Covid  Symptoms mild and are improving. Can use tessalon for cough               "

## 2024-09-17 LAB
NCCN CRITERIA FLAG: NORMAL
TYRER CUZICK SCORE: 9.9

## 2024-09-24 ENCOUNTER — HOSPITAL ENCOUNTER (OUTPATIENT)
Dept: MAMMOGRAPHY | Facility: HOSPITAL | Age: 44
Discharge: HOME | End: 2024-09-24
Admitting: OBSTETRICS & GYNECOLOGY
Payer: COMMERCIAL

## 2024-09-24 DIAGNOSIS — Z12.31 SCREENING MAMMOGRAM FOR BREAST CANCER: ICD-10-CM

## 2024-09-24 PROCEDURE — 77063 BREAST TOMOSYNTHESIS BI: CPT

## 2024-09-24 PROCEDURE — 77067 SCR MAMMO BI INCL CAD: CPT

## 2024-10-07 ENCOUNTER — HOSPITAL ENCOUNTER (OUTPATIENT)
Dept: MAMMOGRAPHY | Facility: HOSPITAL | Age: 44
Discharge: HOME | End: 2024-10-07
Admitting: RADIOLOGY
Payer: COMMERCIAL

## 2024-10-07 ENCOUNTER — TRANSCRIBE ORDERS (OUTPATIENT)
Dept: OBSTETRICS AND GYNECOLOGY | Facility: CLINIC | Age: 44
End: 2024-10-07
Payer: COMMERCIAL

## 2024-10-07 DIAGNOSIS — R92.8 ABNORMAL MAMMOGRAM: ICD-10-CM

## 2024-10-07 DIAGNOSIS — Z12.31 VISIT FOR SCREENING MAMMOGRAM: Primary | ICD-10-CM

## 2024-10-07 PROCEDURE — 77065 DX MAMMO INCL CAD UNI: CPT

## 2024-10-07 PROCEDURE — G0279 TOMOSYNTHESIS, MAMMO: HCPCS

## 2024-10-07 PROCEDURE — 77061 BREAST TOMOSYNTHESIS UNI: CPT | Performed by: RADIOLOGY

## 2024-10-07 PROCEDURE — 77065 DX MAMMO INCL CAD UNI: CPT | Performed by: RADIOLOGY

## 2024-10-15 ENCOUNTER — CLINICAL SUPPORT (OUTPATIENT)
Dept: INTERNAL MEDICINE | Facility: CLINIC | Age: 44
End: 2024-10-15
Payer: COMMERCIAL

## 2024-10-15 DIAGNOSIS — Z23 NEED FOR INFLUENZA VACCINATION: Primary | ICD-10-CM

## 2024-10-15 PROCEDURE — 90656 IIV3 VACC NO PRSV 0.5 ML IM: CPT | Performed by: INTERNAL MEDICINE

## 2024-10-15 PROCEDURE — 90471 IMMUNIZATION ADMIN: CPT | Performed by: INTERNAL MEDICINE

## 2024-10-15 NOTE — PROGRESS NOTES
Immunization  Immunization performed in left deltoid by Manasa Dalton. Patient tolerated the procedure well without complications.  10/15/24   Manasa Dalton

## 2024-11-07 ENCOUNTER — TELEPHONE (OUTPATIENT)
Dept: INTERNAL MEDICINE | Facility: CLINIC | Age: 44
End: 2024-11-07
Payer: COMMERCIAL

## 2024-11-07 NOTE — TELEPHONE ENCOUNTER
Pt been added to same day scheduling, states if she doesn't feel she need the appt anymore she will cancel ahead of time

## 2024-11-07 NOTE — TELEPHONE ENCOUNTER
"Caller: Sue Hall \"Gala\"    Relationship: Self    Best call back number: [phone]     What was the call regarding: PATIENT STATES SHE HAS BEEN HAVING SOME LOWER BACK PAIN. SHE WOULD LIKE A CALL BACK FROM A NURSE TO DISCUSS NEXT STEPS TO TREATING THIS.         "

## 2025-01-22 ENCOUNTER — OFFICE VISIT (OUTPATIENT)
Dept: INTERNAL MEDICINE | Facility: CLINIC | Age: 45
End: 2025-01-22
Payer: COMMERCIAL

## 2025-01-22 VITALS
DIASTOLIC BLOOD PRESSURE: 80 MMHG | HEIGHT: 65 IN | TEMPERATURE: 98.4 F | SYSTOLIC BLOOD PRESSURE: 118 MMHG | BODY MASS INDEX: 26.99 KG/M2 | HEART RATE: 78 BPM | RESPIRATION RATE: 16 BRPM | WEIGHT: 162 LBS | OXYGEN SATURATION: 100 %

## 2025-01-22 DIAGNOSIS — J06.9 VIRAL URI WITH COUGH: Primary | ICD-10-CM

## 2025-01-22 LAB
EXPIRATION DATE: NORMAL
EXPIRATION DATE: NORMAL
FLUAV AG UPPER RESP QL IA.RAPID: NOT DETECTED
FLUBV AG UPPER RESP QL IA.RAPID: NOT DETECTED
INTERNAL CONTROL: NORMAL
INTERNAL CONTROL: NORMAL
Lab: NORMAL
Lab: NORMAL
S PYO AG THROAT QL: NEGATIVE
SARS-COV-2 AG UPPER RESP QL IA.RAPID: NOT DETECTED

## 2025-01-22 PROCEDURE — 87428 SARSCOV & INF VIR A&B AG IA: CPT | Performed by: INTERNAL MEDICINE

## 2025-01-22 PROCEDURE — 99213 OFFICE O/P EST LOW 20 MIN: CPT | Performed by: INTERNAL MEDICINE

## 2025-01-22 PROCEDURE — 87880 STREP A ASSAY W/OPTIC: CPT | Performed by: INTERNAL MEDICINE

## 2025-01-22 NOTE — PROGRESS NOTES
Internal Medicine Acute Visit    Chief Complaint   Patient presents with    Cough     Dry cough X 1 week early January -     Nasal Congestion     01-    Shortness of Breath    Fatigue     Tired and brain fog         HPI  Ms. Hall comes in today due to illness. Dry cough for a week, developed congestion 4-5 days ago, fatigue and SOA beginning 3 days ago. Had diarrhea Saturday while at a swim meet. Had two episodes and now resolved. She has had some pain in upper back near L shoulderblade. She reports lifting weights so unsure if related to illness. No fever. Cough is now starting to resolve. Nasal and sinus drainage in addition to fatigue persist. Has taken nyquil and dayquil.    Cough  Associated symptoms include postnasal drip and shortness of breath. Pertinent negatives include no ear pain, fever or sore throat.   Shortness of Breath  Pertinent negatives include no ear pain, fever or sore throat.   Fatigue  Symptoms include congestion, cough and fatigue.    Pertinent negative symptoms include no fever and no sore throat.        Review of Systems  Review of Systems   Constitutional:  Positive for fatigue. Negative for fever.   HENT:  Positive for congestion and postnasal drip. Negative for ear pain and sore throat.    Respiratory:  Positive for cough and shortness of breath.    Cardiovascular: Negative.    Gastrointestinal:  Positive for diarrhea (now resolved).   Musculoskeletal:  Positive for back pain.        Medications  Current Outpatient Medications on File Prior to Visit   Medication Sig Dispense Refill    Multiple Vitamins-Minerals (MULTIVITAL PO) Take  by mouth.      benzonatate (Tessalon Perles) 100 MG capsule Take 1 capsule by mouth 3 (Three) Times a Day As Needed for Cough. (Patient not taking: Reported on 1/22/2025) 30 capsule 0    estradiol (Vagifem) 10 MCG tablet vaginal tablet Insert 1 tablet into the vagina 2 (Two) Times a Week for 336 days. (Patient not taking: Reported on 1/22/2025) 8  "tablet 11     No current facility-administered medications on file prior to visit.        Allergies  No Known Allergies    PMH  Past Medical History:   Diagnosis Date    Abnormal Pap smear of cervix     Colpo 7/22/2021    Acne     Adjustment disorder with anxious mood 04/27/2022    Cough     Elevated blood pressure reading in office without diagnosis of hypertension 04/27/2022    Headache     Sinusitis     Sore throat     Upper respiratory infection        Objective  Visit Vitals  /80 (BP Location: Left arm, Patient Position: Sitting, Cuff Size: Adult)   Pulse 78   Temp 98.4 °F (36.9 °C) (Infrared)   Resp 16   Ht 165 cm (64.96\")   LMP 01/01/2025 (Exact Date)   SpO2 100%   BMI 26.32 kg/m²        Physical Exam  Physical Exam  Vitals and nursing note reviewed.   Constitutional:       General: She is not in acute distress.     Appearance: She is well-developed. She is not ill-appearing or toxic-appearing.   HENT:      Head: Normocephalic and atraumatic.      Right Ear: Tympanic membrane, ear canal and external ear normal.      Left Ear: Tympanic membrane, ear canal and external ear normal.      Nose: Congestion present.      Mouth/Throat:      Mouth: Mucous membranes are moist.      Pharynx: Oropharynx is clear. No oropharyngeal exudate or posterior oropharyngeal erythema.   Eyes:      Conjunctiva/sclera: Conjunctivae normal.   Cardiovascular:      Rate and Rhythm: Normal rate and regular rhythm.      Heart sounds: Normal heart sounds. No murmur heard.  Pulmonary:      Effort: Pulmonary effort is normal. No respiratory distress.      Breath sounds: Normal breath sounds. No wheezing, rhonchi or rales.   Musculoskeletal:         General: Tenderness (L trapezius) present.   Lymphadenopathy:      Cervical: No cervical adenopathy.   Skin:     General: Skin is warm and dry.   Neurological:      Mental Status: She is alert and oriented to person, place, and time. Mental status is at baseline.      Gait: Gait normal. "   Psychiatric:         Mood and Affect: Mood normal.         Behavior: Behavior normal.         Results  Results for orders placed or performed in visit on 01/22/25   POCT rapid strep A    Collection Time: 01/22/25  2:18 PM    Specimen: Swab   Result Value Ref Range    Rapid Strep A Screen Negative Negative, VALID, INVALID, Not Performed    Internal Control Passed Passed    Lot Number [id number]     Expiration Date 2027-03-06    POCT SARS-CoV-2 Antigen ISABEL + Flu    Collection Time: 01/22/25  3:27 PM    Specimen: Swab   Result Value Ref Range    SARS Antigen Not Detected Not Detected, Presumptive Negative    Influenza A Antigen ISABEL Not Detected Not Detected    Influenza B Antigen ISABEL Not Detected Not Detected    Internal Control Passed Passed    Lot Number [id number]     Expiration Date 2025-11-14         Assessment and Plan  Diagnoses and all orders for this visit:    Viral URI with cough  - POC testing today negative, exam and vitals unremarkable  - recommend continued OTC medications for symptom management, rest and hydration  - can call Monday if no better for antibiotic to treat sinusitis    Health Maintenance  - Pap smear: 8/2024 negative cytology and HPV. Hx of ASCUS with HPV+ 2021. GYN Aguilar but will likely transfer GYN care here. Needs to find out when next pap smear is due.   - Mammogram: 10/2024 BIRADS 1  - Colonoscopy: cologuard 10/2023 negative  - HCV: negative  - Immunizations: COVID due, defer due to illness. flu UTD. Tdap 9/2020.  - Depression screening: negative 1/2025    No follow-ups on file.

## 2025-01-29 ENCOUNTER — TELEPHONE (OUTPATIENT)
Dept: OBSTETRICS AND GYNECOLOGY | Facility: CLINIC | Age: 45
End: 2025-01-29
Payer: COMMERCIAL

## 2025-01-29 NOTE — TELEPHONE ENCOUNTER
PT is calling to see if she is due for a PAP and if she needs one every year or every three years.

## 2025-01-31 ENCOUNTER — LAB (OUTPATIENT)
Dept: LAB | Facility: HOSPITAL | Age: 45
End: 2025-01-31
Payer: COMMERCIAL

## 2025-01-31 ENCOUNTER — OFFICE VISIT (OUTPATIENT)
Dept: INTERNAL MEDICINE | Facility: CLINIC | Age: 45
End: 2025-01-31
Payer: COMMERCIAL

## 2025-01-31 VITALS
TEMPERATURE: 98.1 F | HEART RATE: 60 BPM | HEIGHT: 65 IN | OXYGEN SATURATION: 100 % | SYSTOLIC BLOOD PRESSURE: 112 MMHG | WEIGHT: 160 LBS | BODY MASS INDEX: 26.66 KG/M2 | DIASTOLIC BLOOD PRESSURE: 78 MMHG

## 2025-01-31 DIAGNOSIS — R87.610 ATYPICAL SQUAMOUS CELLS OF UNDETERMINED SIGNIFICANCE (ASCUS) ON PAPANICOLAOU SMEAR OF CERVIX: ICD-10-CM

## 2025-01-31 DIAGNOSIS — Z00.00 ANNUAL PHYSICAL EXAM: Primary | ICD-10-CM

## 2025-01-31 DIAGNOSIS — N32.81 OVERACTIVE BLADDER: ICD-10-CM

## 2025-01-31 DIAGNOSIS — E55.9 VITAMIN D DEFICIENCY: ICD-10-CM

## 2025-01-31 DIAGNOSIS — Z00.00 ANNUAL PHYSICAL EXAM: ICD-10-CM

## 2025-01-31 DIAGNOSIS — L65.9 ALOPECIA: ICD-10-CM

## 2025-01-31 LAB
25(OH)D3 SERPL-MCNC: 29.8 NG/ML (ref 30–100)
ALBUMIN SERPL-MCNC: 4 G/DL (ref 3.5–5.2)
ALBUMIN/GLOB SERPL: 1.3 G/DL
ALP SERPL-CCNC: 61 U/L (ref 39–117)
ALT SERPL W P-5'-P-CCNC: 15 U/L (ref 1–33)
ANION GAP SERPL CALCULATED.3IONS-SCNC: 10.5 MMOL/L (ref 5–15)
AST SERPL-CCNC: 21 U/L (ref 1–32)
BILIRUB SERPL-MCNC: 0.5 MG/DL (ref 0–1.2)
BUN SERPL-MCNC: 12 MG/DL (ref 6–20)
BUN/CREAT SERPL: 12.5 (ref 7–25)
CALCIUM SPEC-SCNC: 9.2 MG/DL (ref 8.6–10.5)
CHLORIDE SERPL-SCNC: 104 MMOL/L (ref 98–107)
CHOLEST SERPL-MCNC: 153 MG/DL (ref 0–200)
CO2 SERPL-SCNC: 23.5 MMOL/L (ref 22–29)
CREAT SERPL-MCNC: 0.96 MG/DL (ref 0.57–1)
DEPRECATED RDW RBC AUTO: 39.3 FL (ref 37–54)
EGFRCR SERPLBLD CKD-EPI 2021: 74 ML/MIN/1.73
ERYTHROCYTE [DISTWIDTH] IN BLOOD BY AUTOMATED COUNT: 12.1 % (ref 12.3–15.4)
GLOBULIN UR ELPH-MCNC: 3.1 GM/DL
GLUCOSE SERPL-MCNC: 82 MG/DL (ref 65–99)
HBA1C MFR BLD: 5.2 % (ref 4.8–5.6)
HCT VFR BLD AUTO: 38.3 % (ref 34–46.6)
HDLC SERPL-MCNC: 59 MG/DL (ref 40–60)
HGB BLD-MCNC: 13.2 G/DL (ref 12–15.9)
LDLC SERPL CALC-MCNC: 85 MG/DL (ref 0–100)
LDLC/HDLC SERPL: 1.45 {RATIO}
MCH RBC QN AUTO: 30.8 PG (ref 26.6–33)
MCHC RBC AUTO-ENTMCNC: 34.5 G/DL (ref 31.5–35.7)
MCV RBC AUTO: 89.3 FL (ref 79–97)
PLATELET # BLD AUTO: 342 10*3/MM3 (ref 140–450)
PMV BLD AUTO: 10.6 FL (ref 6–12)
POTASSIUM SERPL-SCNC: 4.4 MMOL/L (ref 3.5–5.2)
PROT SERPL-MCNC: 7.1 G/DL (ref 6–8.5)
RBC # BLD AUTO: 4.29 10*6/MM3 (ref 3.77–5.28)
SODIUM SERPL-SCNC: 138 MMOL/L (ref 136–145)
TRIGL SERPL-MCNC: 41 MG/DL (ref 0–150)
TSH SERPL DL<=0.05 MIU/L-ACNC: 2.49 UIU/ML (ref 0.27–4.2)
VLDLC SERPL-MCNC: 9 MG/DL (ref 5–40)
WBC NRBC COR # BLD AUTO: 6.76 10*3/MM3 (ref 3.4–10.8)

## 2025-01-31 PROCEDURE — 80061 LIPID PANEL: CPT

## 2025-01-31 PROCEDURE — 80050 GENERAL HEALTH PANEL: CPT

## 2025-01-31 PROCEDURE — 82306 VITAMIN D 25 HYDROXY: CPT

## 2025-01-31 PROCEDURE — 83036 HEMOGLOBIN GLYCOSYLATED A1C: CPT

## 2025-01-31 PROCEDURE — 99396 PREV VISIT EST AGE 40-64: CPT | Performed by: INTERNAL MEDICINE

## 2025-01-31 NOTE — PROGRESS NOTES
Internal Medicine Annual Exam  Sue Hall is a 44 y.o. female who presents today for an annual exam and with concerns as outlined below.    Chief Complaint  Chief Complaint   Patient presents with    Annual Exam        HPI  Ms. Hall comes in today for her physical. She is doing well but notes ongoing hair loss, urinary frequency in the morning, and upper back pain she relates to gynecomastia. She is up to date with GYN exam, mammogram, dental and vision exams, vaccines with exception of COVID19. She defers COVID19 today due to upcoming travel but will return. She notes that she will have pap smear this year and then if normal will be able to return to routine screening. She may decide at that time to have pap smear done here. She did cologuard 10/2023 which was negative. She did consult with plastic surgery at  for breast reduction but insurance changed and she has not started this process over. She is not sure if she wants to pursue the surgery. Denies use of tobacco, ecigarettes, illicit drugs, and drinks alcohol in moderation. She is exercising 30 minutes a day. She does report diet high in carbs with plan to make changes this year.           Review of Systems  Review of Systems   Constitutional: Negative.    Respiratory: Negative.     Cardiovascular: Negative.    Gastrointestinal: Negative.    Genitourinary:  Positive for frequency (chronic). Negative for decreased urine volume and difficulty urinating.   Musculoskeletal:  Positive for back pain.   Skin:         +alopecia   Neurological: Negative.    Psychiatric/Behavioral: Negative.          Past Medical History  Past Medical History:   Diagnosis Date    Abnormal Pap smear of cervix     Colpo 7/22/2021    Acne     Adjustment disorder with anxious mood 04/27/2022    Cough     Elevated blood pressure reading in office without diagnosis of hypertension 04/27/2022    Headache     Sinusitis     Sore throat     Upper respiratory infection         Surgical  "History  Past Surgical History:   Procedure Laterality Date     SECTION      COLPOSCOPY  2021    ASCUS HPV non 16/18 pap        Family History  Family History   Problem Relation Age of Onset    Hypertension Father     Coronary artery disease Father         had CABG    Diabetes Maternal Grandmother     Hypertension Maternal Grandmother     Hypertension Maternal Grandfather     Diabetes Paternal Grandmother     Hypertension Paternal Grandmother     Uterine cancer Paternal Grandmother     Cancer Paternal Grandmother         Uterus cancer    Breast cancer Neg Hx     Ovarian cancer Neg Hx     Colon cancer Neg Hx         Social History  Social History     Socioeconomic History    Marital status:    Tobacco Use    Smoking status: Never    Smokeless tobacco: Never   Vaping Use    Vaping status: Never Used   Substance and Sexual Activity    Alcohol use: Yes     Alcohol/week: 9.0 standard drinks of alcohol     Types: 4 Glasses of wine, 5 Standard drinks or equivalent per week    Drug use: No    Sexual activity: Yes     Partners: Male     Birth control/protection: Condom        Current Medications  Current Outpatient Medications on File Prior to Visit   Medication Sig Dispense Refill    Multiple Vitamins-Minerals (MULTIVITAL PO) Take  by mouth.       No current facility-administered medications on file prior to visit.       Allergies  No Known Allergies     Objective  Visit Vitals  Ht 165.1 cm (65\")   Wt 72.6 kg (160 lb)   LMP 2025 (Exact Date)   BMI 26.63 kg/m²        Physical Exam  Physical Exam  Vitals and nursing note reviewed.   Constitutional:       General: She is not in acute distress.     Appearance: She is well-developed. She is not ill-appearing, toxic-appearing or diaphoretic.   HENT:      Head: Normocephalic and atraumatic.      Right Ear: Tympanic membrane, ear canal and external ear normal.      Left Ear: Tympanic membrane, ear canal and external ear normal.      Nose: Nose normal. "   Eyes:      General: No scleral icterus.     Conjunctiva/sclera: Conjunctivae normal.   Cardiovascular:      Rate and Rhythm: Normal rate and regular rhythm.      Heart sounds: Normal heart sounds. No murmur heard.  Pulmonary:      Effort: Pulmonary effort is normal. No respiratory distress.      Breath sounds: Normal breath sounds.   Abdominal:      General: There is no distension.      Palpations: Abdomen is soft. There is no mass.      Tenderness: There is no abdominal tenderness.   Musculoskeletal:         General: No deformity.      Cervical back: Neck supple.      Right lower leg: No edema.      Left lower leg: No edema.   Skin:     General: Skin is warm and dry.      Findings: No rash.   Neurological:      Mental Status: She is alert and oriented to person, place, and time. Mental status is at baseline.      Gait: Gait normal.   Psychiatric:         Mood and Affect: Mood normal.         Behavior: Behavior normal.         Thought Content: Thought content normal.         Judgment: Judgment normal.          Results  Results for orders placed or performed in visit on 01/22/25   POCT rapid strep A    Collection Time: 01/22/25  2:18 PM    Specimen: Swab   Result Value Ref Range    Rapid Strep A Screen Negative Negative, VALID, INVALID, Not Performed    Internal Control Passed Passed    Lot Number [id number]     Expiration Date 2027-03-06    POCT SARS-CoV-2 Antigen ISABEL + Flu    Collection Time: 01/22/25  3:27 PM    Specimen: Swab   Result Value Ref Range    SARS Antigen Not Detected Not Detected, Presumptive Negative    Influenza A Antigen ISABEL Not Detected Not Detected    Influenza B Antigen ISABEL Not Detected Not Detected    Internal Control Passed Passed    Lot Number [id number]     Expiration Date 2025-11-14         Assessment and Plan  Diagnoses and all orders for this visit:    Annual physical exam  - Counseling was given to patient for the following topics:  appropriate exercise, healthy eating habits, importance  of self breast exam and breast health, and importance of immunizations, including risks and benefits. Also discussed the importance of regular dental and vision care.  -     TSH Rfx On Abnormal To Free T4; Future  -     CBC (No Diff); Future  -     Comprehensive Metabolic Panel; Future  -     Hemoglobin A1c; Future  -     Lipid Panel; Future    Atypical squamous cells of undetermined significance (ASCUS) on Papanicolaou smear of cervix  - following with GYN, previous colposcopy negative and most recent pap 8/2024 negative cytology, HPV negative   - she plans to have pap smear this year with GYN and has been told that if this is again normal she can return to routine screening every 3y  - she may opt to have future pap smears in this office    Vitamin D deficiency  - borderline low last year  - ongoing alopecia possibly due to low D  - will check level and she will add additional 1000IU vitamin D if still borderline or low    Overactive bladder  - defers medication today    Alopecia  - iron levels normal, D borderline as discussed above  - she may try hair, skin, nails vitamin or additional vitamin D as noted above       Health Maintenance   Topic Date Due    COVID-19 Vaccine (5 - 2024-25 season) 09/01/2024    ANNUAL PHYSICAL  01/19/2025    MAMMOGRAM  10/07/2025    BMI FOLLOWUP  01/22/2026    COLORECTAL CANCER SCREENING  10/23/2026    PAP SMEAR  08/27/2027    TDAP/TD VACCINES (3 - Td or Tdap) 09/16/2030    HEPATITIS C SCREENING  Completed    INFLUENZA VACCINE  Completed    Pneumococcal Vaccine 0-64  Aged Out     Health Maintenance  - Pap smear: 8/2024 negative cytology and HPV. Hx of ASCUS with HPV+ 2021. GYN [facility]. Will have pap smear 9/2025 and then if normal transfer GYN care here.  - Mammogram: 10/2024 BIRADS 1  - Colonoscopy: cologuard 10/2023 negative  - HCV: negative  - Immunizations: COVID deferred. flu UTD. Tdap 9/2020.  - Depression screening: negative 1/2025    Return in about 1 year (around 1/31/2026) for  Annual, Labs today.

## 2025-08-28 ENCOUNTER — OFFICE VISIT (OUTPATIENT)
Dept: OBSTETRICS AND GYNECOLOGY | Facility: CLINIC | Age: 45
End: 2025-08-28
Payer: COMMERCIAL

## 2025-08-28 VITALS
SYSTOLIC BLOOD PRESSURE: 114 MMHG | DIASTOLIC BLOOD PRESSURE: 74 MMHG | BODY MASS INDEX: 27.32 KG/M2 | WEIGHT: 164 LBS | HEIGHT: 65 IN

## 2025-08-28 DIAGNOSIS — N95.1 MENOPAUSAL SYMPTOMS: ICD-10-CM

## 2025-08-28 DIAGNOSIS — M54.9 CHRONIC BACK PAIN, UNSPECIFIED BACK LOCATION, UNSPECIFIED BACK PAIN LATERALITY: ICD-10-CM

## 2025-08-28 DIAGNOSIS — R39.15 URINARY URGENCY: ICD-10-CM

## 2025-08-28 DIAGNOSIS — Z12.31 BREAST CANCER SCREENING BY MAMMOGRAM: ICD-10-CM

## 2025-08-28 DIAGNOSIS — Z87.42 HISTORY OF ABNORMAL CERVICAL PAP SMEAR: ICD-10-CM

## 2025-08-28 DIAGNOSIS — N64.89 BILATERAL PENDULOUS BREASTS: ICD-10-CM

## 2025-08-28 DIAGNOSIS — G89.29 CHRONIC BACK PAIN, UNSPECIFIED BACK LOCATION, UNSPECIFIED BACK PAIN LATERALITY: ICD-10-CM

## 2025-08-28 DIAGNOSIS — Z01.419 WOMEN'S ANNUAL ROUTINE GYNECOLOGICAL EXAMINATION: Primary | ICD-10-CM

## 2025-08-28 DIAGNOSIS — N95.1 PERIMENOPAUSAL: ICD-10-CM

## 2025-08-28 LAB
BILIRUB BLD-MCNC: NEGATIVE MG/DL
CLARITY, POC: CLEAR
COLOR UR: YELLOW
GLUCOSE UR STRIP-MCNC: NEGATIVE MG/DL
KETONES UR QL: NEGATIVE
LEUKOCYTE EST, POC: NEGATIVE
NITRITE UR-MCNC: NEGATIVE MG/ML
PH UR: 6.5 [PH] (ref 5–8)
PROT UR STRIP-MCNC: NEGATIVE MG/DL
RBC # UR STRIP: NEGATIVE /UL
SP GR UR: 1 (ref 1–1.03)
UROBILINOGEN UR QL: NORMAL